# Patient Record
Sex: FEMALE | Race: WHITE | Employment: FULL TIME | URBAN - METROPOLITAN AREA
[De-identification: names, ages, dates, MRNs, and addresses within clinical notes are randomized per-mention and may not be internally consistent; named-entity substitution may affect disease eponyms.]

---

## 2017-01-23 ENCOUNTER — TELEPHONE (OUTPATIENT)
Dept: FAMILY MEDICINE CLINIC | Age: 27
End: 2017-01-23

## 2017-01-23 NOTE — TELEPHONE ENCOUNTER
Pt's psychiatry that you had referred the pt to has closed down so she is in need of advice for a new psych and a new referral to one. Also pt needs a prior auth for her adderall medication. She would like a physical copy so that she can take the authorization to the new psych whomever that might be. Please call pt with when you have a new psychiarty doctor for her.

## 2017-11-08 DIAGNOSIS — N91.0 DELAYED MENSES: Primary | ICD-10-CM

## 2017-11-16 ENCOUNTER — HOSPITAL ENCOUNTER (OUTPATIENT)
Dept: LAB | Age: 27
Discharge: HOME | End: 2017-11-16
Payer: SELF-PAY

## 2017-11-16 DIAGNOSIS — N91.0 DELAYED MENSES: ICD-10-CM

## 2017-11-16 LAB — HCG SERPL-ACNC: ABNORMAL MIU/ML (ref 0–10)

## 2017-11-16 PROCEDURE — 84702 CHORIONIC GONADOTROPIN TEST: CPT | Performed by: INTERNAL MEDICINE

## 2017-11-16 PROCEDURE — 36415 COLL VENOUS BLD VENIPUNCTURE: CPT | Performed by: INTERNAL MEDICINE

## 2017-11-17 NOTE — PROGRESS NOTES
Unable to reach patient, listed mobile number not in service, secondary home phone no voicemail set-up

## 2017-11-20 ENCOUNTER — TELEPHONE (OUTPATIENT)
Dept: FAMILY MEDICINE CLINIC | Age: 27
End: 2017-11-20

## 2017-11-20 NOTE — TELEPHONE ENCOUNTER
Attempted to contact patient to reschedule appt, unable to leave message voicemail not set-up, secondary number listed not a working number. No answer at emergency contact.

## 2018-01-25 ENCOUNTER — HOSPITAL ENCOUNTER (INPATIENT)
Age: 28
LOS: 4 days | Discharge: HOME | DRG: 781 | End: 2018-01-29
Attending: STUDENT IN AN ORGANIZED HEALTH CARE EDUCATION/TRAINING PROGRAM | Admitting: PSYCHIATRY & NEUROLOGY
Payer: COMMERCIAL

## 2018-01-25 PROCEDURE — 74011250637 HC RX REV CODE- 250/637: Performed by: PSYCHIATRY & NEUROLOGY

## 2018-01-25 PROCEDURE — 65220000003 HC RM SEMIPRIVATE PSYCH

## 2018-01-25 RX ORDER — DIPHENHYDRAMINE HYDROCHLORIDE 50 MG/ML
25 INJECTION, SOLUTION INTRAMUSCULAR; INTRAVENOUS
Status: DISCONTINUED | OUTPATIENT
Start: 2018-01-25 | End: 2018-01-29 | Stop reason: HOSPADM

## 2018-01-25 RX ORDER — ZOLPIDEM TARTRATE 5 MG/1
5 TABLET ORAL
Status: DISCONTINUED | OUTPATIENT
Start: 2018-01-25 | End: 2018-01-29 | Stop reason: HOSPADM

## 2018-01-25 RX ORDER — DIPHENHYDRAMINE HCL 25 MG
25 CAPSULE ORAL
Status: DISCONTINUED | OUTPATIENT
Start: 2018-01-25 | End: 2018-01-29 | Stop reason: HOSPADM

## 2018-01-25 RX ORDER — HALOPERIDOL 2 MG/1
2 TABLET ORAL
Status: DISCONTINUED | OUTPATIENT
Start: 2018-01-25 | End: 2018-01-29 | Stop reason: HOSPADM

## 2018-01-25 RX ORDER — HALOPERIDOL 5 MG/ML
2 INJECTION INTRAMUSCULAR
Status: DISCONTINUED | OUTPATIENT
Start: 2018-01-25 | End: 2018-01-29 | Stop reason: HOSPADM

## 2018-01-25 RX ADMIN — DIPHENHYDRAMINE HYDROCHLORIDE 25 MG: 25 CAPSULE ORAL at 20:54

## 2018-01-25 NOTE — IP AVS SNAPSHOT
[address] [facility] [address] 
 
 
 [address] [facility] Patient: Thomas Peñaloza MRN: [id number] SMQ:1/1/5867 About your hospitalization You were admitted on:  January 25, 2018 You last received care in the:  [facility] ADULT CHEM DEP You were discharged on:  January 29, 2018 Why you were hospitalized Your primary diagnosis was:  Not on File Your diagnoses also included:  Bipolar Disorder (Hcc) Follow-up Information Follow up With Details Comments Contact Info George Nelson, [address] [facility] [address] [employer] [address] [facility] [id number] [phone] [address] [facility] #[phone] at [address] Aliza Zuluaga [address] [facility] [address] [place], [address] [facility] Aliza Zuluaga Contact CSB person is Nikita Fuentes #[phone] & fax # [phone]. Aliza Zuluaga Pt's appointment is Thursday 2/1/18 at 8:30am with Abilio Grant for Orientation. Aliza Zuluaga Discharge Orders None A check leatha indicates which time of day the medication should be taken. My Medications STOP taking these medications   
 dextroamphetamine-amphetamine 20 mg tablet Commonly known as:  ADDERALL Discharge Instructions BEHAVIORAL HEALTH NURSING DISCHARGE NOTE The following personal items collected during your admission are returned to you:  
Dental Appliance: Dental Appliances: None Vision: Visual Aid: None Hearing Aid:   
Jewelry: Jewelry: None Clothing: Clothing: Footwear, Jacket/Coat, Pants, Shirt, With patient Other Valuables: Other Valuables: Cell Phone, Maryannelee Samiing (locker 123/1) Valuables sent to safe: Personal Items Sent to Safe: saulo dickson PATIENT INSTRUCTIONS: 
 
 
  
 
 
The discharge information has been reviewed with the patient. The patient verbalized understanding. Patient armband removed and shredded Introducing [facility]! Joselo Alexander introduces [employer] patient portal. Now you can access parts of your medical record, email your doctor's office, and request medication refills online. 1. In your internet browser, go to [email or web address] 2. Click on the First Time User? Click Here link in the Sign In box. You will see the New Member Sign Up page. 3. Enter your [employer]tek Access Code exactly as it appears below. You will not need to use this code after youve completed the sign-up process. If you do not sign up before the expiration date, you must request a new code. · [employer] Access Code: [email or web address] Expires: 2/24/2018 12:19 PM 
 
4. Enter the last four digits of your Social Security Number (xxxx) and Date of Birth (mm/dd/yyyy) as indicated and click Submit. You will be taken to the next sign-up page. 5. Create a Fangtek ID. This will be your [employer]tek login ID and cannot be changed, so think of one that is secure and easy to remember. 6. Create a Fangtek password. You can change your password at any time. 7. Enter your Password Reset Question and Answer. This can be used at a later time if you forget your password. 8. Enter your e-mail address. You will receive e-mail notification when new information is available in [address] 9. Click Sign Up. You can now view and download portions of your medical record. 10. Click the Download Summary menu link to download a portable copy of your medical information. If you have questions, please visit the Frequently Asked Questions section of the [employer] website. Remember, [employer] is NOT to be used for urgent needs. For medical emergencies, dial 911. Now available from your iPhone and Android! Providers Seen During Your Hospitalization Provider Specialty Primary office phone Deven Rodriguez MD Psychiatry [phone] Your Primary Care Physician (PCP) Primary Care Physician Office Phone Office Fax Surinder Plane [phone] You are allergic to the following No active allergies Recent Documentation Height Weight Breastfeeding? BMI OB Status Smoking Status 1.626 m 65.8 kg No 24.89 kg/m2 Pregnant Current Every Day Smoker Emergency Contacts Name Discharge Info Relation Home Work Mobile Cydney Bruno DISCHARGE CAREGIVER [3] Parent [1] [phone] Joannezoie Sterling  Boyfriend [17] [phone] Patient Belongings The following personal items are in your possession at time of discharge: 
  Dental Appliances: None  Visual Aid: None      Home Medications: None   Jewelry: None  Clothing: Footwear, Jacket/Coat, Pants, Shirt, With patient    Other Valuables: Cell Phone, [employer] (locker 123/1)  Personal Items Sent to Safe: saulo dickson Please provide this summary of care documentation to your next provider. Signatures-by signing, you are acknowledging that this After Visit Summary has been reviewed with you and you have received a copy. Patient Signature:  ____________________________________________________________ Date:  ____________________________________________________________  
  
Michelle María Elena Provider Signature:  ____________________________________________________________ Date:  ____________________________________________________________

## 2018-01-25 NOTE — IP AVS SNAPSHOT
[address] [place] [address] Patient: Marti Del Cid MRN: [id number] VCT:0/2/5028 A check leatha indicates which time of day the medication should be taken. My Medications STOP taking these medications   
 dextroamphetamine-amphetamine 20 mg tablet Commonly known as:  ADDERALL

## 2018-01-25 NOTE — BH NOTES
Patient arrived safely to unit ambulatory; A&O x4 and VSS. Admission paperwork was reviewed and signed by patient; patient rights and treatment team plan process. Patient belongings were checked and monitored for safety. Patient was oriented to unit and room. Patient is a 29 y/o female presenting with an animated affect and euthymic mood. She is 5 months pregnant, and lives with significant other, in her mother's house. Patient is unemployed, and reports having some financial and housing concerns. Patient denies SI/HI and AVH; responded to internal stimuli during admission assessment. Patient reports a hx of ADD; \" I take adderall three times a day, and am trying to ween off of it like my doctor said. \" Patient admits to smoking . 5 ppd of cigarettes and using marijuana \"once in a while\" but denied all other medication; UDS positive for THC, Amphetamine, Methamphetamine, and cocaine on 1/20/18. Patient also states that she is allergic to \"all antipsychotics\" and that they \"make me feel like I have too much estrogen\". Patient was quite guarded during the admission process and was delusional about reason for admission to BMU and length of stay.

## 2018-01-26 PROCEDURE — 74011250637 HC RX REV CODE- 250/637: Performed by: PSYCHIATRY & NEUROLOGY

## 2018-01-26 PROCEDURE — 65220000003 HC RM SEMIPRIVATE PSYCH

## 2018-01-26 RX ADMIN — PRENATAL VITAMINS-IRON FUMARATE 27 MG IRON-FOLIC ACID 0.8 MG TABLET 1 TABLET: at 08:19

## 2018-01-26 NOTE — H&P
History and Physical        Patient: Álvaro Donis               Sex: female          DOA: 1/25/2018         YOB: 1990      Age:  28 y.o.        LOS:  LOS: 1 day        HPI:     Álvaro Donis is a 28 y.o. female who was admitted experiencing delusional thinking, insomnia and auditory hallucinations. Active Problems:    [facility] ([facility]10[facility]2010)        Past Medical History:   Diagnosis Date    ADHD (attention deficit hyperactivity disorder)     Bipolar disorder ([facility] 3/10/2010    Polyphagia(783.6) 3/10/2010       Past Surgical History:   Procedure Laterality Date    HX GYN      D&C       Family History   Problem Relation Age of Onset    Diabetes Maternal Grandfather        Social History     Social History    Marital status: SINGLE     Spouse name: N/A    Number of children: 51/2 months pregnant    Years of education: 11     Social History Main Topics    Smoking status: Current Every Day Smoker     Packs/day: 0.50     Types: Cigarettes    Smokeless tobacco: Never Used    Alcohol use Yes      Comment: occassionally    Drug use: No    Sexual activity: No     Other Topics Concern    Not on file     Social History Narrative   Lives alone. Appetite and sleep are okay. Unemployed. Prior to Admission medications    Medication Sig Start Date End Date Taking? Authorizing Provider   dextroamphetamine-amphetamine (ADDERALL) 20 mg tablet Take 1 Tab by mouth two (2) times a day. Max Daily Amount: 40 mg. Patient taking differently: Take 20 mg by mouth three (3) times daily. 4/3/15   Damian Napier MD       No Known Allergies    Review of Systems  A comprehensive review of systems was negative.       Physical Exam:      Visit Vitals    /66 (BP 1 Location: Right arm, BP Patient Position: Sitting)    Pulse 90    Temp 96.7 °F (35.9 °C)    Resp 18    Ht 5' 4\" (1.626 m)    Wt 145 lb (65.8 kg)    Breastfeeding No    BMI 24.89 kg/m2       Physical Exam:    General: Alert, cooperative, well developed, well nourished  female, no distress, appears stated age. Eyes:  Conjunctivae/corneas clear. PERRL, EOMs intact. Fundi benign   Ears:  Normal TMs and external ear canals both ears. Nose: Nares normal. Septum midline. Mucosa normal. No drainage or sinus tenderness. Mouth/Throat: Lips, mucosa, and tongue normal. Teeth and gums normal.   Neck: Supple, symmetrical, trachea midline, no adenopathy, thyroid: no enlargement/tenderness/nodules, no carotid bruit and no JVD. Back:   Symmetric, no curvature. ROM normal. No CVA tenderness. Lungs:   Clear to auscultation bilaterally. Heart:  Regular rate and rhythm, S1, S2 normal, no murmur, click, rub or gallop. Abdomen:   51/2 months pregnant, non-tender. Bowel sounds normal. No masses,  No organomegaly. Extremities: Extremities normal, atraumatic, no cyanosis or edema. Pulses: 2+ and symmetric all extremities. Skin: Skin color, texture, turgor normal. No rashes or lesions   Lymph nodes: Cervical, supraclavicular, and axillary nodes normal.   Neurologic: CNII-XII intact. Normal strength, sensation and reflexes throughout.            Assessment/Plan     Delusions  Insomnia  Auditory Hallucinations  Labs reviewed (+Amphetamines, Cocaine)  Treatment per physician's orders

## 2018-01-26 NOTE — BH NOTES
Pt walked past nurses station - stopped - looked around- appeared to be responding to internal stimuli- but denied this.

## 2018-01-26 NOTE — BSMART NOTE
OCCUPATIONAL THERAPY PROGRESS NOTE  Group Time:  2403  Attendance: The patient attended full group. Participation:  The patient participated with minimal elaboration in the activity. Attention:  The patient was able to focus on the activity. Interaction:  The patient acknowledges others or responds to questions,  with no spontaneous interaction. \"Smiling\" throughout activity. Answers vague and hard for her to explain, said she wanted to change how she \"spreads her love around\" and through questioning this did not appear to mean anything clear or do anything different. No spontaneous interaction. Despite the constant \"smile\", affect seemed blunted.

## 2018-01-26 NOTE — BH NOTES
KANDICEHANDER Note: -S/O- The above pt has been pacing the entire unit all a.m. Shift. She has appeared very jose pious, thought blocking, and at times responding to internal stimuli at times when out in the milieu when pacing down the hallway. She has not been aggressive none this shift. She appeared upset due to not getting discharged however was able to calm down with re-direction this shift. She has a visit from MARGARITA  which appeared to have went well this shift. She has not experienced any falls this shift. -A-Problem #1 cont. -P-Maintain a safe and supportive enviorment.

## 2018-01-26 NOTE — BH NOTES
GROUP THERAPY PROGRESS NOTE    Jonathan Taylor is participating in [place]. Group time: 45 minutes    Personal goal for participation: rules/ regulations    Goal orientation: community    Group therapy participation: minimal and passive    Therapeutic interventions reviewed and discussed: She was calm during group. Impression of participation: She was alert calm but appeared to jpre-occupied in thought while in group she was educated on on coping skills she appeared to be receptive of this information during group.

## 2018-01-26 NOTE — BH NOTES
Maurilio Arita is not participating in [address] Group    Group time: 1hour    Personal goal for participation:  Seek information on Alcohol      Goal orientation: passive    Group therapy participation:   refuse    Therapeutic interventions reviewed and discussed: Staff encouraged Pt. To participate in Group    Impression of participation:   Pt. Refuse, chose to rest in bed, despite staff encouragement.
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
## 2018-01-26 NOTE — BSMART NOTE
SW Contact: Collateral with Maegan Francois #[phone]  & fax #[phone]. .. We reviewed screening & legal documents that accompanied pt from transfer from [facility]... She met with pt individually & we both did so together later. . Pt seemed to be extremely suspicious & cautious of what her comments were, sharing very little. Pt unwilling to sign any future release for [facility], nor wanting us to contact family. .. We both made her aware we would set up follow up appointments for her but would NOT force her to go. . That brought some relief, but still no release, not any more clinical shared. .. Her Dr was given updates on these interactions.

## 2018-01-27 PROCEDURE — 65220000003 HC RM SEMIPRIVATE PSYCH

## 2018-01-27 PROCEDURE — 74011250637 HC RX REV CODE- 250/637: Performed by: PSYCHIATRY & NEUROLOGY

## 2018-01-27 RX ADMIN — DIPHENHYDRAMINE HYDROCHLORIDE 25 MG: 25 CAPSULE ORAL at 02:58

## 2018-01-27 RX ADMIN — PRENATAL VITAMINS-IRON FUMARATE 27 MG IRON-FOLIC ACID 0.8 MG TABLET 1 TABLET: at 08:16

## 2018-01-27 NOTE — PROGRESS NOTES
Patient c/o restlessness and requested Benadryl; patient given Benadryl 25 mg given po for restlessness; will monitor and maintain safe environment.

## 2018-01-27 NOTE — BH NOTES
The pt was discussed with nursing staff and was seen during rounds. She states she would like more food as she continues to be hungry after completing her meals. She denies SI and HI. The pt also denies psychosis. She reports being prescribed Adderall as an outpatient and states it helps her sleep. The pt was reminded of PRNs available for sleep. She has been feeling the baby move and last felt her move today. MSE-  32yo WF. Appears stated age. Gravid abdomen. Appropriately dressed and groomed. Cooperative. Speech is normal in rate, volume, and tone. No psychomotor agitation or retardation. Stated mood is, \"Good. \"  Affect is odd. Thoughts are goal-directed and succinct. Denies SI and HI. Denies psychosis. Insight and judgment are difficult to gauge. A/P-ADHD by hx; Delusional disorder-preliminary  1. Continue hospitalization.

## 2018-01-27 NOTE — BH NOTES
GROUP THERAPY PROGRESS NOTE    Caesar Solitario is participating in Recreational Therapy. Group time: 1900    Personal goal for participation: fresh air break/games on the unit    Goal orientation: social    Group therapy participation: active    Therapeutic interventions reviewed and discussed: Staff encouraged Pt to get off the unit and go outside and get some fresh air, or play games on the unit with peers. Impression of participation:  Pt. chose to stay on the unit, play games with peers, color davi patterns and watch a movie.

## 2018-01-27 NOTE — BH NOTES
Patient continues to pace the unit and interacts with specific male patients. Patient will sit down and talk to certain patients but otherwise paces if they are not in the milieu. Patient will walk around and stand and look, then smile and walk away. Patient appears to have difficulty controlling her emotions at times but then she realizes instantly and reigns herself in. Patient has not been any concern on the unit except for the bizarre behavior that is harmless.

## 2018-01-27 NOTE — PROGRESS NOTES
Problem: Psychosis  Goal: *STG: Remains safe in hospital  Will remain safe and seek staff  instead of endorsing internal stimuli self harm, or harm towards others, arise on a daily basis during stay at hospital.   Outcome: Progressing Towards Goal  Pt remains safe. Problem: Falls - Risk of  Goal: *Absence of Falls  Document Daya Fall Risk and appropriate interventions in the flowsheet. Outcome: Progressing Towards Goal  Fall Risk Interventions:  Pt remains free of falls. Problem: Psychosis  Goal: *STG/LTG: Complies with medication therapy  Will take prescribed daily medications during stay at hospital.   Outcome: Progressing Towards Goal  Pt takes medications as ordered. Comments: Patient has been pacing around the milieu and has been pleasant but odd. Patient will stand and listen to conversations, not participate and then wander off. Patient got a little upset about something and started hollering at lunchtime seemingly about another patient but calmed down when staff approached. Patient asked for additional food at lunchtime and calmed down when it was given to her. Patient denies SI/HI and AVH. Patient will follow random patients around the unit, appear to interact slightly and then walk away. Patient was also noted to be going into room 126 which is a male room. Patient has been spoken to about that and was able to be redirected.

## 2018-01-27 NOTE — PROGRESS NOTES
Problem: Psychosis  Goal: *STG: Remains safe in hospital  Will remain safe and seek staff  instead of endorsing internal stimuli self harm, or harm towards others, arise on a daily basis during stay at hospital.   Outcome: Progressing Towards Goal  Did not engage in any hazardous behavior during shift. Problem: Falls - Risk of  Goal: *Absence of Falls  Document Daya Fall Risk and appropriate interventions in the flowsheet. Outcome: Progressing Towards Goal  Daya fall assessment completed and interventions maintained throughout shift. Problem: Psychosis  Goal: *STG/LTG: Demonstrates improved thought patterns as evidenced by logical and coherent speech  Will demonstrate improved thought patterns, as evidenced by logical and coherent speech, during daily interactions throughout stay at hospital.   Outcome: Not Progressing Towards Goal  Still endorsing internal stimuli, and having difficulty with coherent speech during extended interactions.     Comments: Did not engage in any hazardous behavior during shift.     Daya fall assessment completed and interventions maintained throughout shift.     Still endorsing internal stimuli, and having difficulty with coherent speech during extended interactions.

## 2018-01-27 NOTE — BH NOTES
Willard Hines is participating in Leisure Activity Group. Group time: 6025    Personal goal for participation:  Decrease Anxiety    Goal orientation: social    Group therapy participation: active    Therapeutic interventions reviewed and discussed:  Staff encouraged to  choos relaxation activities to decrease anxiety while interacting with peers    Impression of participation:  Pt. chose to , play games with peers, color davi patterns or watch a movie.
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
## 2018-01-28 PROCEDURE — 74011250637 HC RX REV CODE- 250/637: Performed by: PSYCHIATRY & NEUROLOGY

## 2018-01-28 PROCEDURE — 65220000003 HC RM SEMIPRIVATE PSYCH

## 2018-01-28 RX ADMIN — PRENATAL VITAMINS-IRON FUMARATE 27 MG IRON-FOLIC ACID 0.8 MG TABLET 1 TABLET: at 08:04

## 2018-01-28 NOTE — BH NOTES
The pt was discussed with nursing staff and was seen during rounds. The pt reports having slept well. She reports feeling more emotional today due to missing her boyfriend. She states he has been in alf since she has been hospitalized. The pt states she is anxious for discharge. She continues to feel her baby move.       MSE-  26yo WF. Appears stated age. Gravid abdomen. Appropriately dressed and groomed. Cooperative. Speech is normal in rate, volume, and tone. No psychomotor agitation or retardation. Stated mood continues to be, \"Good. \"  Affect is tearful at times. Thoughts are somewhat disorganized at times. Denies SI and HI. Denies psychosis. Insight and judgment appear somewhat limited.       A/P-Delusional disorder-preliminary; ADHD by hx  1. Continue hospitalization.

## 2018-01-28 NOTE — PROGRESS NOTES
Problem: Psychosis  Goal: *STG: Decreased hallucinations  Will have decreased or absence of hallucinations on a daily basis during stay at hospital aeb decreased ritualistic behavior and improved ADLs. Outcome: Progressing Towards Goal  aeb patient denies any hallucinations    Problem: Psychosis  Goal: *STG: Remains safe in hospital  Will remain safe and seek staff  instead of endorsing internal stimuli self harm, or harm towards others, arise on a daily basis during stay at hospital.   Outcome: Progressing Towards Goal  aeb no unsafe behaviors observed    Comments: Patient is alert an oriented x 3. She does come out into the day area for brief periods but sits alone and is observed sitting quietly. She has been cooperative. Patient does have an odd. Flat affect and was very guarded during assessment. She reports has felt the baby move and and held up six fingers when asked how far along she is with her pregnancy. She denies visual and auditory hallucinations. No suicidal or homicidal ideations. Staff continues to monitor for safety and provide a supportive environment.

## 2018-01-28 NOTE — BH NOTES
Patient has been pacing around the unit today and has required redirection a couple of times because she was wandering close to other rooms. Patient is pleasant and cooperative and able to engage in a conversation about her baby, but seems to be guarded still. Patient came out of her room and spoke some angry nonsense about pineapples to the person on the phone and then slammed her door going in to her room but other than that she has been fine all shift. Patient denies SI/HI and AVH and is looking forward to discharge tomorrow.

## 2018-01-28 NOTE — BH NOTES
GROUP THERAPY PROGRESS NOTE    Caesar Solitario is participating in medication. Group time: 15 minutes    Personal goal for participation: Understanding the benefits of medication.     Goal orientation: medication education    Group therapy participation: minimal    Therapeutic interventions reviewed and discussed:     Impression of participation: Patient continued pacing the unit

## 2018-01-28 NOTE — BH NOTES
Patient walking around the unit appearing to respond to internal stimuli. Patient denied AVH at current. Patient was redirected for wearing a sheer shirt that was also too short. Patient took constructive criticism without difficulty and went to room.

## 2018-01-28 NOTE — BH NOTES
GROUP THERAPY PROGRESS NOTE    Roslyn Swenson is participating in [place]. Group time: 30 minutes    Personal goal for participation: discussed unit guidelines and expectations on unit      Goal orientation: personal    Group therapy participation: minimal    Therapeutic interventions reviewed and discussed:     Impression of participation: Sat appearing to be listening but at times getting up to pace.

## 2018-01-29 VITALS
WEIGHT: 145 LBS | SYSTOLIC BLOOD PRESSURE: 113 MMHG | TEMPERATURE: 98.1 F | HEART RATE: 94 BPM | RESPIRATION RATE: 18 BRPM | HEIGHT: 64 IN | DIASTOLIC BLOOD PRESSURE: 71 MMHG | BODY MASS INDEX: 24.75 KG/M2

## 2018-01-29 PROCEDURE — 74011250637 HC RX REV CODE- 250/637: Performed by: PSYCHIATRY & NEUROLOGY

## 2018-01-29 RX ADMIN — PRENATAL VITAMINS-IRON FUMARATE 27 MG IRON-FOLIC ACID 0.8 MG TABLET 1 TABLET: at 09:00

## 2018-01-29 NOTE — DISCHARGE INSTRUCTIONS
BEHAVIORAL HEALTH NURSING DISCHARGE NOTE      The following personal items collected during your admission are returned to you:   Dental Appliance: Dental Appliances: None  Vision: Visual Aid: None  Hearing Aid:    Jewelry: Jewelry: None  Clothing: Clothing: Footwear, Jacket/Coat, Pants, Shirt, With patient  Other Valuables: Other Valuables: Cell Phone, Jeff  (locker 123/1)  Valuables sent to safe: Personal Items Sent to Safe: saulo [place] randolph      PATIENT INSTRUCTIONS:             The discharge information has been reviewed with the patient. The patient verbalized understanding.         Patient armband removed and shredded

## 2018-01-29 NOTE — PROGRESS NOTES
Patient slightly teary-eyed, \"just getting out some emotions. ..don't want to talk about it. \" paced in the hallway for a short time, then sat quietly in the milieu; will monitor and maintain safe environment.

## 2018-01-29 NOTE — PROGRESS NOTES
Patient received discharge appts. Patient states she called her mother and she was ready to leave. Patient states she should be here in 5 minutes. Discuss discharge plans with . No further orders received. . All personal items given to patient. Escorted out of building.

## 2018-01-29 NOTE — BSMART NOTE
SW Contact: collateral: Phone & text with Maegan Francois #[phone]. .. Gave her update on pt's stabilizing over weekend & dr's decision to d/c pt, with pt agreeing to comply with [facility] appointments. .   Ms Chasity Childress still plans to visit pt prior to d/c.

## 2018-01-29 NOTE — BSMART NOTE
SW Contact:  Today's interaction started somewhat guarded but not as intense as prior, once pt understood it was about her d/c planning. .. She agreed to comply with outpt services with [address] [place] [address] (see isabel for details). She is arranging transportation with her mom to assist pt in returning [place] [facility] [place].
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
## 2018-04-12 ENCOUNTER — TELEPHONE (OUTPATIENT)
Dept: FAMILY MEDICINE CLINIC | Age: 28
End: 2018-04-12

## 2018-04-12 NOTE — TELEPHONE ENCOUNTER
Pepper Peralta can you confirm this, I believe she failed a UDS before her pregnancy, and is under psych care

## 2018-04-12 NOTE — TELEPHONE ENCOUNTER
Pt called to request a refill on her Adderal 20 mg x 3 times a day.      Future Appointments  Date Time Provider Maegan Lissa   4/17/2018 3:45 PM Rip Jameson MD [facility]

## 2018-04-13 ENCOUNTER — TELEPHONE (OUTPATIENT)
Dept: FAMILY MEDICINE CLINIC | Age: 28
End: 2018-04-13

## 2018-04-13 NOTE — TELEPHONE ENCOUNTER
She was refereed to [facility] in 2015 (printed)  They've disbanded so she needs a new referral if she is not with any psych at present  In any case I have closed my practice to any new or returned add therapies

## 2018-04-13 NOTE — TELEPHONE ENCOUNTER
UDS on 1-20-18 ordered by ER  Positive for marijuana, amphetamine,      There were no notes to terminate patient after 1/20/18, UDS. There is no current narc agreement on file. Please advise.

## 2018-04-17 ENCOUNTER — OFFICE VISIT (OUTPATIENT)
Dept: FAMILY MEDICINE CLINIC | Age: 28
End: 2018-04-17

## 2018-04-17 VITALS
SYSTOLIC BLOOD PRESSURE: 130 MMHG | WEIGHT: 169 LBS | HEIGHT: 64 IN | HEART RATE: 113 BPM | TEMPERATURE: 97 F | DIASTOLIC BLOOD PRESSURE: 96 MMHG | RESPIRATION RATE: 22 BRPM | BODY MASS INDEX: 28.85 KG/M2 | OXYGEN SATURATION: 98 %

## 2018-04-17 DIAGNOSIS — F90.9 ATTENTION DEFICIT HYPERACTIVITY DISORDER (ADHD), UNSPECIFIED ADHD TYPE: Primary | ICD-10-CM

## 2018-04-17 RX ORDER — LANOLIN ALCOHOL/MO/W.PET/CERES
CREAM (GRAM) TOPICAL
COMMUNITY
End: 2018-05-12

## 2018-04-17 RX ORDER — DEXTROAMPHETAMINE SACCHARATE, AMPHETAMINE ASPARTATE, DEXTROAMPHETAMINE SULFATE AND AMPHETAMINE SULFATE 5; 5; 5; 5 MG/1; MG/1; MG/1; MG/1
20 TABLET ORAL
Status: CANCELLED | OUTPATIENT
Start: 2018-04-17

## 2018-04-17 RX ORDER — DEXTROAMPHETAMINE SACCHARATE, AMPHETAMINE ASPARTATE, DEXTROAMPHETAMINE SULFATE AND AMPHETAMINE SULFATE 5; 5; 5; 5 MG/1; MG/1; MG/1; MG/1
20 TABLET ORAL
COMMUNITY
End: 2018-10-04 | Stop reason: SDUPTHER

## 2018-04-17 NOTE — PROGRESS NOTES
Cynthia Davison is a 33 y.o. female (: 1990) presenting to address:    Chief Complaint   Patient presents with    Attention Deficit Disorder    Medication Refill     pain scale 0/10       Vitals:    18 1516   BP: (!) 130/96   Pulse: (!) 113   Resp: 22   Temp: 97 °F (36.1 °C)   TempSrc: Oral   SpO2: 98%   Weight: 169 lb (76.7 kg)   Height: 5' 4\" (1.626 m)   PainSc:   0 - No pain       Hearing/Vision:   No exam data present    Learning Assessment:     Learning Assessment 2015   PRIMARY LEARNER Patient   HIGHEST LEVEL OF EDUCATION - PRIMARY LEARNER  DID NOT GRADUATE HIGH SCHOOL   BARRIERS PRIMARY LEARNER -   CO-LEARNER CAREGIVER -   PRIMARY LANGUAGE ENGLISH    NEED -   LEARNER PREFERENCE PRIMARY PICTURES   ANSWERED BY patient   RELATIONSHIP SELF     Depression Screening:   No flowsheet data found. Fall Risk Assessment:   No flowsheet data found. Abuse Screening:     Abuse Screening Questionnaire 4/3/2015   Do you ever feel afraid of your partner? N   Are you in a relationship with someone who physically or mentally threatens you? N   Is it safe for you to go home? Y     Coordination of Care Questionaire:   1. Have you been to the ER, urgent care clinic since your last visit? Hospitalized since your last visit? NO    2. Have you seen or consulted any other health care providers outside of the [address] [place] [address] since your last visit? Include any pap smears or colon screening. YES Dr. Dixon    Advanced Directive:   1. Do you have an Advanced Directive? NO    2. Would you like information on Advanced Directives?  NO

## 2018-04-17 NOTE — PROGRESS NOTES
HISTORY OF PRESENT ILLNESS  Ivania Shore is a 33 y.o. female. HPI  Add under psych  Almost at term of pregnancy    Review of Systems   All other systems reviewed and are negative. Past Medical History:   Diagnosis Date    ADHD (attention deficit hyperactivity disorder)     Bipolar disorder ([facility] [address] 3/10/2010    Polyphagia(783.6) 3/10/2010     No current outpatient prescriptions on file prior to visit. No current facility-administered medications on file prior to visit. Visit Vitals    BP (!) 130/96 (BP 1 Location: Right arm, BP Patient Position: Sitting)    Pulse (!) 113    Temp 97 °F (36.1 °C) (Oral)    Resp 22    Ht 5' 4\" (1.626 m)    Wt 169 lb (76.7 kg)    SpO2 98%    BMI 29.01 kg/m2         Physical Exam   Constitutional: She appears well-developed and well-nourished. No distress. Skin: She is not diaphoretic. Psychiatric: She has a normal mood and affect. Her behavior is normal. Judgment and thought content normal.   Vitals reviewed.   d/w Dr. Nabila León[facility]  Recent + UDS  Gyn recommends avoiding amphetamine stimulants due to  drug withdrawal syndrome  Related all this to patient  Also my policy to no longer accept new patients for ADD therapy    ASSESSMENT and PLAN  ADD   Plan  F/u prn  Review UDS  Refer to psych

## 2018-04-17 NOTE — MR AVS SNAPSHOT
Leonides Shell 
 
 
 [address] [facility] [id number] [phone] Patient: Noelle Roa MRN: [id number] Visit Information Date & Time Provider Department Dept. Phone Encounter #  
 4/17/2018  3:45 PM Zaid Marquez MD [employer] [phone] [id number] Follow-up Instructions Return if symptoms worsen or fail to improve. Follow-up and Disposition History Upcoming Health Maintenance Date Due Pneumococcal 19-64 Medium Risk (1 of 1 - PPSV23) 2/9/2008 DTaP/Tdap/Td series (1 - Tdap) 2/9/2010 PAP AKA CERVICAL CYTOLOGY 2/9/2010 Influenza Age 5 to Adult 8/1/2017 OB 3RD TRIMESTER TDAP 2/5/2018 Allergies as of 4/17/2018  Review Complete On: 4/17/2018 By: Zaid Marquez MD  
 No Known Allergies Current Immunizations  Reviewed on 1/27/2018 No immunizations on file. Not reviewed this visit You Were Diagnosed With   
  
 Codes Comments Attention deficit hyperactivity disorder (ADHD), unspecified ADHD type    -  Primary ICD-10-CM: F90.9 ICD-9-CM: 314.01 Vitals BP Pulse Temp Resp Height(growth percentile) Weight(growth percentile) (!) 130/96 (BP 1 Location: Right arm, BP Patient Position: Sitting) (!) 113 97 °F (36.1 °C) (Oral) 22 5' 4\" (1.626 m) 169 lb (76.7 kg) SpO2 BMI OB Status Smoking Status 98% 29.01 kg/m2 Pregnant Current Every Day Smoker Vitals History BMI and BSA Data Body Mass Index Body Surface Area  
 29.01 kg/m 2 1.86 m 2 Preferred Pharmacy Pharmacy Name Phone [facility] [phone] [facility] [address] [place] [address] AT [place] [phone] Your Updated Medication List  
  
   
This list is accurate as of 4/17/18  4:17 PM.  Always use your most recent med list.  
  
  
  
  
 ADDERALL 20 mg tablet Generic drug:  dextroamphetamine-amphetamine Take 20 mg by mouth. ferrous sulfate 325 mg (65 mg iron) tablet Take  by mouth Daily (before breakfast). prenatal vit-iron fumarate-fa 27 mg iron- 0.8 mg Tab tablet Take 1 Tab by mouth daily. Follow-up Instructions Return if symptoms worsen or fail to improve. Introducing [facility] & HEALTH SERVICES! Hayes Harding introduces MitoProd patient portal. Now you can access parts of your medical record, email your doctor's office, and request medication refills online. 1. In your internet browser, go to [email or web address] 2. Click on the First Time User? Click Here link in the Sign In box. You will see the New Member Sign Up page. 3. Enter your MitoProd Access Code exactly as it appears below. You will not need to use this code after youve completed the sign-up process. If you do not sign up before the expiration date, you must request a new code. · MitoProd Access Code: [email or web address] Expires: 7/16/2018  4:17 PM 
 
4. Enter the last four digits of your Social Security Number (xxxx) and Date of Birth (mm/dd/yyyy) as indicated and click Submit. You will be taken to the next sign-up page. 5. Create a MitoProd ID. This will be your MitoProd login ID and cannot be changed, so think of one that is secure and easy to remember. 6. Create a MitoProd password. You can change your password at any time. 7. Enter your Password Reset Question and Answer. This can be used at a later time if you forget your password. 8. Enter your e-mail address. You will receive e-mail notification when new information is available in [address] 9. Click Sign Up. You can now view and download portions of your medical record. 10. Click the Download Summary menu link to download a portable copy of your medical information. If you have questions, please visit the Frequently Asked Questions section of the MitoProd website.  Remember, MitoProd is NOT to be used for urgent needs. For medical emergencies, dial 911. Now available from your iPhone and Android! Please provide this summary of care documentation to your next provider. Your primary care clinician is listed as Jeniffer Johnson. If you have any questions after today's visit, please call [phone].

## 2018-05-08 PROBLEM — O48.0 POST TERM PREGNANCY: Status: ACTIVE | Noted: 2018-05-08

## 2018-05-22 ENCOUNTER — OFFICE VISIT (OUTPATIENT)
Dept: FAMILY MEDICINE CLINIC | Age: 28
End: 2018-05-22

## 2018-05-22 VITALS
TEMPERATURE: 96.1 F | OXYGEN SATURATION: 96 % | SYSTOLIC BLOOD PRESSURE: 144 MMHG | BODY MASS INDEX: 25.23 KG/M2 | RESPIRATION RATE: 20 BRPM | HEIGHT: 64 IN | WEIGHT: 147.8 LBS | DIASTOLIC BLOOD PRESSURE: 96 MMHG | HEART RATE: 92 BPM

## 2018-05-22 DIAGNOSIS — F90.9 ATTENTION DEFICIT HYPERACTIVITY DISORDER (ADHD), UNSPECIFIED ADHD TYPE: ICD-10-CM

## 2018-05-22 RX ORDER — DEXTROAMPHETAMINE SACCHARATE, AMPHETAMINE ASPARTATE, DEXTROAMPHETAMINE SULFATE AND AMPHETAMINE SULFATE 5; 5; 5; 5 MG/1; MG/1; MG/1; MG/1
20 TABLET ORAL 2 TIMES DAILY
Qty: 60 TAB | Refills: 0 | Status: CANCELLED | OUTPATIENT
Start: 2018-05-22

## 2018-05-22 NOTE — MR AVS SNAPSHOT
[address] [place] [address] [address] [facility] [id number] [phone] Patient: Terence Galo MRN: [id number] WN Visit Information Date & Time Provider Department Dept. Phone Encounter #  
 2018 10:45 AM Therese [employer] [phone] [id number] Follow-up Instructions Return if symptoms worsen or fail to improve. Upcoming Health Maintenance Date Due  
 PAP AKA CERVICAL CYTOLOGY 2010 Pneumococcal 19-64 Medium Risk (1 of 1 - PPSV23) 10/22/2018* Influenza Age 5 to Adult 2018 DTaP/Tdap/Td series (2 - Td) 5/10/2028 *Topic was postponed. The date shown is not the original due date. Allergies as of 2018  Review Complete On: 2018 By: Remington Chaudhari MD  
 No Known Allergies Current Immunizations  Reviewed on 2018 Name Date Tdap 5/10/2018  6:59 PM  
  
 Not reviewed this visit You Were Diagnosed With   
  
 Codes Comments Attention deficit hyperactivity disorder (ADHD), unspecified ADHD type     ICD-10-CM: F90.9 ICD-9-CM: 314.01 Vitals BP Pulse Temp Resp Height(growth percentile) Weight(growth percentile) (!) 144/96 (BP 1 Location: Right arm, BP Patient Position: Sitting) 92 96.1 °F (35.6 °C) (Oral) 20 5' 4\" (1.626 m) 147 lb 12.8 oz (67 kg) SpO2 BMI OB Status Smoking Status 96% 25.37 kg/m2 Recent pregnancy Current Every Day Smoker BMI and BSA Data Body Mass Index Body Surface Area  
 25.37 kg/m 2 1.74 m 2 Preferred Pharmacy Pharmacy Name Phone [facility] [phone] [facility], [address] [place] [address] AT [place] [address] Your Updated Medication List  
  
   
This list is accurate as of 18 11:53 AM.  Always use your most recent med list.  
  
  
  
  
 * ADDERALL 20 mg tablet Generic drug:  dextroamphetamine-amphetamine Take 20 mg by mouth. * dextroamphetamine-amphetamine 20 mg tablet Commonly known as:  ADDERALL Take 1 Tab (20 mg total) by mouth two (2) times a dayIndications: Attention-Deficit Hyperactivity Disorder. Max Daily Amount: 40 mg  
  
 ibuprofen 600 mg tablet Commonly known as:  MOTRIN Take 1 Tab by mouth every six (6) hours as needed. oxyCODONE-acetaminophen 5-325 mg per tablet Commonly known as:  PERCOCET Take 1-2 Tabs by mouth every four (4) hours as needed. Max Daily Amount: 12 Tabs. prenatal vit-iron fumarate-fa 27 mg iron- 0.8 mg Tab tablet Take 1 Tab by mouth daily. * Notice: This list has 2 medication(s) that are the same as other medications prescribed for you. Read the directions carefully, and ask your doctor or other care provider to review them with you. Follow-up Instructions Return if symptoms worsen or fail to improve. Introducing [facility] & HEALTH SERVICES! [employer] introduces Bueroservice24 patient portal. Now you can access parts of your medical record, email your doctor's office, and request medication refills online. 1. In your internet browser, go to [email or web address] 2. Click on the First Time User? Click Here link in the Sign In box. You will see the New Member Sign Up page. 3. Enter your Bueroservice24 Access Code exactly as it appears below. You will not need to use this code after youve completed the sign-up process. If you do not sign up before the expiration date, you must request a new code. · [email or web address] Access Code: [email or web address] Expires: 7/16/2018  4:17 PM 
 
4. Enter the last four digits of your Social Security Number (xxxx) and Date of Birth (mm/dd/yyyy) as indicated and click Submit. You will be taken to the next sign-up page. 5. Create a Bueroservice24 ID. This will be your Bueroservice24 login ID and cannot be changed, so think of one that is secure and easy to remember. 6. Create a Click4Care password. You can change your password at any time. 7. Enter your Password Reset Question and Answer. This can be used at a later time if you forget your password. 8. Enter your e-mail address. You will receive e-mail notification when new information is available in [address] 9. Click Sign Up. You can now view and download portions of your medical record. 10. Click the Download Summary menu link to download a portable copy of your medical information. If you have questions, please visit the Frequently Asked Questions section of the Click4Care website. Remember, Click4Care is NOT to be used for urgent needs. For medical emergencies, dial 911. Now available from your iPhone and Android! Please provide this summary of care documentation to your next provider. Your primary care clinician is listed as Nitish Retana. If you have any questions after today's visit, please call [phone].

## 2018-05-22 NOTE — PROGRESS NOTES
Symone Jain is a 33 y.o. female (: 1990) presenting to address:    Chief Complaint   Patient presents with    Medication Refill     pain scale 0/10       Vitals:    18 1047   BP: (!) 144/96   Pulse: 92   Resp: 20   Temp: 96.1 °F (35.6 °C)   TempSrc: Oral   SpO2: 96%   Weight: 147 lb 12.8 oz (67 kg)   Height: 5' 4\" (1.626 m)   PainSc:   0 - No pain       Hearing/Vision:   No exam data present    Learning Assessment:     Learning Assessment 2015   PRIMARY LEARNER Patient   HIGHEST LEVEL OF EDUCATION - PRIMARY LEARNER  DID NOT GRADUATE HIGH SCHOOL   BARRIERS PRIMARY LEARNER -   CO-LEARNER CAREGIVER -   PRIMARY LANGUAGE ENGLISH    NEED -   LEARNER PREFERENCE PRIMARY PICTURES   ANSWERED BY patient   RELATIONSHIP SELF     Depression Screening:   No flowsheet data found. Fall Risk Assessment:   No flowsheet data found. Abuse Screening:     Abuse Screening Questionnaire 4/3/2015   Do you ever feel afraid of your partner? N   Are you in a relationship with someone who physically or mentally threatens you? N   Is it safe for you to go home? Y     Coordination of Care Questionaire:   1. Have you been to the ER, urgent care clinic since your last visit? Hospitalized since your last visit? YES [facility]    2. Have you seen or consulted any other health care providers outside of the [facility] since your last visit? Include any pap smears or colon screening. YES pediatric affiliates of [facility]    Advanced Directive:   1. Do you have an Advanced Directive? NO    2. Would you like information on Advanced Directives?  NO

## 2018-05-22 NOTE — PROGRESS NOTES
HISTORY OF PRESENT ILLNESS  Kyung Valdes is a 33 y.o. female. HPI  Add stable  Recent pregnancy  Review of Systems   All other systems reviewed and are negative. Past Medical History:   Diagnosis Date    ADHD (attention deficit hyperactivity disorder)     Bipolar disorder ([facility] [address] 3/10/2010    Polyphagia(783.6) 3/10/2010     Current Outpatient Prescriptions on File Prior to Visit   Medication Sig Dispense Refill    dextroamphetamine-amphetamine (ADDERALL) 20 mg tablet Take 1 Tab (20 mg total) by mouth two (2) times a dayIndications: Attention-Deficit Hyperactivity Disorder. Max Daily Amount: 40 mg 60 Tab 0    dextroamphetamine-amphetamine (ADDERALL) 20 mg tablet Take 20 mg by mouth.  ibuprofen (MOTRIN) 600 mg tablet Take 1 Tab by mouth every six (6) hours as needed. 90 Tab 0    oxyCODONE-acetaminophen (PERCOCET) 5-325 mg per tablet Take 1-2 Tabs by mouth every four (4) hours as needed. Max Daily Amount: 12 Tabs. 25 Tab 0    prenatal vit-iron fumarate-fa 27 mg iron- 0.8 mg tab tablet Take 1 Tab by mouth daily. No current facility-administered medications on file prior to visit. Visit Vitals    BP (!) 144/96 (BP 1 Location: Right arm, BP Patient Position: Sitting)    Pulse 92    Temp 96.1 °F (35.6 °C) (Oral)    Resp 20    Ht 5' 4\" (1.626 m)    Wt 147 lb 12.8 oz (67 kg)    SpO2 96%    BMI 25.37 kg/m2       Physical Exam   Constitutional: She appears well-developed and well-nourished. No distress. Skin: She is not diaphoretic. Psychiatric: She has a normal mood and affect. Her behavior is normal. Judgment and thought content normal.   Vitals reviewed.    reviewed  Has adderal until 06/12 from gyn (?)  Explained I am not accepting any new patients for add rx  Was given [place] city service # to call for [facility] mental health services    ASSESSMENT and PLAN  add   Plan  As above  F/u prn

## 2018-05-25 ENCOUNTER — TELEPHONE (OUTPATIENT)
Dept: FAMILY MEDICINE CLINIC | Age: 28
End: 2018-05-25

## 2018-05-25 NOTE — TELEPHONE ENCOUNTER
Pt had an appt with Dr. Vickey Jaime, 5/22/18, and states she was given a referral number for medication of Adderall. The pt states no one is picking up and wants to know if its possible to get another referral. Please advise.

## 2018-06-07 RX ORDER — DEXTROAMPHETAMINE SACCHARATE, AMPHETAMINE ASPARTATE, DEXTROAMPHETAMINE SULFATE AND AMPHETAMINE SULFATE 5; 5; 5; 5 MG/1; MG/1; MG/1; MG/1
20 TABLET ORAL
OUTPATIENT
Start: 2018-06-07

## 2018-06-21 ENCOUNTER — TELEPHONE (OUTPATIENT)
Dept: FAMILY MEDICINE CLINIC | Age: 28
End: 2018-06-21

## 2018-06-21 NOTE — TELEPHONE ENCOUNTER
Patient is at [facility] ER with her child, they are calling stating patients baby is failing to thrive, states child is less than birth weight at 9 weeks old. They are concerned with her mental health, patient tells them that Dr. Gerald Sandhoff is that last person to prescribe her medication. I informed them Gerald Sandhoff has not treated her for mental health since 2015 as we had referred her out to [facility] in 2015 and she had not been back to us until April 2018. They faxed over a records request so I printed last 5 office visits and sent it back to them.

## 2018-09-21 ENCOUNTER — TELEPHONE (OUTPATIENT)
Dept: FAMILY MEDICINE CLINIC | Age: 28
End: 2018-09-21

## 2018-09-21 DIAGNOSIS — Z00.00 ROUTINE GENERAL MEDICAL EXAMINATION AT A HEALTH CARE FACILITY: Primary | ICD-10-CM

## 2018-09-21 NOTE — TELEPHONE ENCOUNTER
Pt needs lab orders for upcoming physical. Please call pt when orders are in.      Future Appointments  Date Time Provider Maegan Lissa   10/4/2018 2:30 PM Jory Li  W. Annabella Gamino

## 2018-09-28 LAB
25(OH)D3 SERPL-MCNC: 18 NG/ML (ref 32–100)
A-G RATIO,AGRAT: 1.6 RATIO (ref 1.1–2.6)
ALBUMIN SERPL-MCNC: 4.5 G/DL (ref 3.5–5)
ALP SERPL-CCNC: 57 U/L (ref 25–115)
ALT SERPL-CCNC: 13 U/L (ref 5–40)
ANION GAP SERPL CALC-SCNC: 17 MMOL/L
AST SERPL W P-5'-P-CCNC: 11 U/L (ref 10–37)
BILIRUB SERPL-MCNC: 0.5 MG/DL (ref 0.2–1.2)
BILIRUBIN, DIRECT,CBIL: <0.2 MG/DL (ref 0–0.3)
BUN SERPL-MCNC: 9 MG/DL (ref 6–22)
CALCIUM SERPL-MCNC: 9.4 MG/DL (ref 8.4–10.5)
CHLORIDE SERPL-SCNC: 99 MMOL/L (ref 98–110)
CHOLEST SERPL-MCNC: 190 MG/DL (ref 110–200)
CO2 SERPL-SCNC: 24 MMOL/L (ref 20–32)
CREAT SERPL-MCNC: 0.5 MG/DL (ref 0.5–1.2)
ERYTHROCYTE [DISTWIDTH] IN BLOOD BY AUTOMATED COUNT: 15.1 % (ref 10–15.5)
GFRAA, 66117: >60
GFRNA, 66118: >60
GLOBULIN,GLOB: 2.8 G/DL (ref 2–4)
GLUCOSE SERPL-MCNC: 89 MG/DL (ref 70–99)
HCT VFR BLD AUTO: 41.1 % (ref 35.1–46.5)
HDLC SERPL-MCNC: 3.2 MG/DL (ref 0–5)
HDLC SERPL-MCNC: 59 MG/DL (ref 40–59)
HGB BLD-MCNC: 13.1 G/DL (ref 11.7–15.5)
LDLC SERPL CALC-MCNC: 115 MG/DL (ref 50–99)
MCH RBC QN AUTO: 27 PG (ref 26–34)
MCHC RBC AUTO-ENTMCNC: 32 G/DL (ref 31–36)
MCV RBC AUTO: 85 FL (ref 80–95)
PLATELET # BLD AUTO: 391 K/UL (ref 140–440)
PMV BLD AUTO: 10.1 FL (ref 9–13)
POTASSIUM SERPL-SCNC: 4 MMOL/L (ref 3.5–5.5)
PROT SERPL-MCNC: 7.3 G/DL (ref 6.4–8.3)
RBC # BLD AUTO: 4.86 M/UL (ref 3.8–5.2)
SODIUM SERPL-SCNC: 140 MMOL/L (ref 133–145)
TRIGL SERPL-MCNC: 76 MG/DL (ref 40–149)
VLDLC SERPL CALC-MCNC: 15 MG/DL (ref 8–30)
WBC # BLD AUTO: 6.6 K/UL (ref 4–11)

## 2018-10-04 ENCOUNTER — OFFICE VISIT (OUTPATIENT)
Dept: FAMILY MEDICINE CLINIC | Age: 28
End: 2018-10-04

## 2018-10-04 VITALS
SYSTOLIC BLOOD PRESSURE: 126 MMHG | TEMPERATURE: 97.2 F | HEIGHT: 64 IN | BODY MASS INDEX: 27.31 KG/M2 | RESPIRATION RATE: 18 BRPM | HEART RATE: 107 BPM | WEIGHT: 160 LBS | OXYGEN SATURATION: 98 % | DIASTOLIC BLOOD PRESSURE: 90 MMHG

## 2018-10-04 DIAGNOSIS — Z11.4 SCREENING FOR HIV (HUMAN IMMUNODEFICIENCY VIRUS): ICD-10-CM

## 2018-10-04 DIAGNOSIS — Z23 ENCOUNTER FOR IMMUNIZATION: ICD-10-CM

## 2018-10-04 DIAGNOSIS — Z00.00 ROUTINE GENERAL MEDICAL EXAMINATION AT A HEALTH CARE FACILITY: Primary | ICD-10-CM

## 2018-10-04 DIAGNOSIS — E55.9 VITAMIN D DEFICIENCY: ICD-10-CM

## 2018-10-04 NOTE — PROGRESS NOTES
Subjective:   33 y.o. female for Well Woman Check. Her gyne and breast care is done elsewhere by her Ob-Gyne physician. Current Outpatient Prescriptions   Medication Sig Dispense Refill    dextroamphetamine-amphetamine (ADDERALL) 20 mg tablet Take 1 Tab (20 mg total) by mouth two (2) times a dayIndications: Attention-Deficit Hyperactivity Disorder. Max Daily Amount: 40 mg 60 Tab 0    ibuprofen (MOTRIN) 600 mg tablet Take 1 Tab by mouth every six (6) hours as needed. 90 Tab 0    oxyCODONE-acetaminophen (PERCOCET) 5-325 mg per tablet Take 1-2 Tabs by mouth every four (4) hours as needed. Max Daily Amount: 12 Tabs. 25 Tab 0    prenatal vit-iron fumarate-fa 27 mg iron- 0.8 mg tab tablet Take 1 Tab by mouth daily. No Known Allergies     Lab Results  Component Value Date/Time   WBC 6.6 09/27/2018 03:47 AM   HGB 13.1 09/27/2018 03:47 AM   HCT 41.1 09/27/2018 03:47 AM   PLATELET 830 12/50/5514 03:47 AM   MCV 85 09/27/2018 03:47 AM     Lab Results  Component Value Date/Time   Cholesterol, total 190 09/27/2018 03:47 AM   HDL Cholesterol 59 09/27/2018 03:47 AM   LDL, calculated 115 (H) 09/27/2018 03:47 AM   Triglyceride 76 09/27/2018 03:47 AM     Lab Results  Component Value Date/Time   ALT (SGPT) 13 09/27/2018 03:47 AM   AST (SGOT) 11 09/27/2018 03:47 AM   Alk. phosphatase 57 09/27/2018 03:47 AM   Bilirubin, direct <0.2 09/27/2018 03:47 AM   Bilirubin, total 0.5 09/27/2018 03:47 AM   Albumin 4.5 09/27/2018 03:47 AM   Protein, total 7.3 09/27/2018 03:47 AM   PLATELET 682 37/10/2618 03:47 AM       Lab Results  Component Value Date/Time   TSH, External Normal-1.280 12/18/2017         Specific concerns today: would like HIV testing for possible exposure. Review of Systems  Pertinent items are noted in HPI. Objective:   Blood pressure 126/90, pulse (!) 107, temperature 97.2 °F (36.2 °C), temperature source Oral, resp.  rate 18, height 5' 4\" (1.626 m), weight 160 lb (72.6 kg), last menstrual period 10/01/2018, SpO2 98 %, not currently breastfeeding. Physical Examination:   General appearance - alert, well appearing, and in no distress  Mental status - alert, oriented to person, place, and time  Eyes - pupils equal and reactive, extraocular eye movements intact  Ears - bilateral TM's and external ear canals normal  Nose - normal and patent, no erythema, discharge or polyps  Mouth - mucous membranes moist, pharynx normal without lesions  Neck - supple, no significant adenopathy  Lymphatics - no palpable lymphadenopathy, no hepatosplenomegaly  Chest - clear to auscultation, no wheezes, rales or rhonchi, symmetric air entry  Heart - normal rate, regular rhythm, normal S1, S2, no murmurs, rubs, clicks or gallops  Abdomen - soft, nontender, nondistended, no masses or organomegaly  Back exam - full range of motion, no tenderness, palpable spasm or pain on motion  Neurological - alert, oriented, normal speech, no focal findings or movement disorder noted  Musculoskeletal - no joint tenderness, deformity or swelling  Extremities - peripheral pulses normal, no pedal edema, no clubbing or cyanosis  Skin - mild acne     Assessment/Plan:   Hiv test ordered   Labs revealed low vit d start 5000 units daily OTC and see back in 1 year  lose weight, increase physical activity, follow low fat diet, call if any problems  Encounter Diagnoses   Name Primary?     Routine general medical examination at a health care facility Yes    Screening for HIV (human immunodeficiency virus)     Vitamin D deficiency      Orders Placed This Encounter    HIV 1/2 AG/AB, 4TH GENERATION,W RFLX CONFIRM

## 2018-10-04 NOTE — PROGRESS NOTES
Todd Guerrero is a 33 y.o. female (: 1990) presenting to address:CPE labs have been completed     Chief Complaint   Patient presents with    Complete Physical       Vitals:    10/04/18 1416   BP: 126/90   Pulse: (!) 107   Resp: 18   Temp: 97.2 °F (36.2 °C)   TempSrc: Oral   SpO2: 98%   Weight: 160 lb (72.6 kg)   Height: 5' 4\" (1.626 m)   PainSc:   0 - No pain   LMP: 10/01/2018       Hearing/Vision:   No exam data present    Learning Assessment:     Learning Assessment 2015   PRIMARY LEARNER Patient   HIGHEST LEVEL OF EDUCATION - PRIMARY LEARNER  DID NOT GRADUATE HIGH SCHOOL   BARRIERS PRIMARY LEARNER -   CO-LEARNER CAREGIVER -   PRIMARY LANGUAGE ENGLISH    NEED -   LEARNER PREFERENCE PRIMARY PICTURES   ANSWERED BY patient   RELATIONSHIP SELF     Depression Screening:   No flowsheet data found. Fall Risk Assessment:   No flowsheet data found. Abuse Screening:     Abuse Screening Questionnaire 4/3/2015   Do you ever feel afraid of your partner? N   Are you in a relationship with someone who physically or mentally threatens you? N   Is it safe for you to go home? Y     Coordination of Care Questionaire:   1. Have you been to the ER, urgent care clinic since your last visit? Hospitalized since your last visit? no    2. Have you seen or consulted any other health care providers outside of the [facility] since your last visit? Include any pap smears or colon screening. no    Advanced Directive:   1. Do you have an Advanced Directive? no    2. Would you like information on Advanced Directives? no    Patient declined flu vaccine today.

## 2018-10-04 NOTE — PATIENT INSTRUCTIONS
Well Visit, Ages 25 to 48: Care Instructions  Your Care Instructions    Physical exams can help you stay healthy. Your doctor has checked your overall health and may have suggested ways to take good care of yourself. He or she also may have recommended tests. At home, you can help prevent illness with healthy eating, regular exercise, and other steps. Follow-up care is a key part of your treatment and safety. Be sure to make and go to all appointments, and call your doctor if you are having problems. It's also a good idea to know your test results and keep a list of the medicines you take. How can you care for yourself at home? · Reach and stay at a healthy weight. This will lower your risk for many problems, such as obesity, diabetes, heart disease, and high blood pressure. · Get at least 30 minutes of physical activity on most days of the week. Walking is a good choice. You also may want to do other activities, such as running, swimming, cycling, or playing tennis or team sports. Discuss any changes in your exercise program with your doctor. · Do not smoke or allow others to smoke around you. If you need help quitting, talk to your doctor about stop-smoking programs and medicines. These can increase your chances of quitting for good. · Talk to your doctor about whether you have any risk factors for sexually transmitted infections (STIs). Having one sex partner (who does not have STIs and does not have sex with anyone else) is a good way to avoid these infections. · Use birth control if you do not want to have children at this time. Talk with your doctor about the choices available and what might be best for you. · Protect your skin from too much sun. When you're outdoors from 10 a.m. to 4 p.m., stay in the shade or cover up with clothing and a hat with a wide brim. Wear sunglasses that block UV rays. Even when it's cloudy, put broad-spectrum sunscreen (SPF 30 or higher) on any exposed skin.   · See a dentist one or two times a year for checkups and to have your teeth cleaned. · Wear a seat belt in the car. · Drink alcohol in moderation, if at all. That means no more than 2 drinks a day for men and 1 drink a day for women. Follow your doctor's advice about when to have certain tests. These tests can spot problems early. For everyone  · Cholesterol. Have the fat (cholesterol) in your blood tested after age 21. Your doctor will tell you how often to have this done based on your age, family history, or other things that can increase your risk for heart disease. · Blood pressure. Have your blood pressure checked during a routine doctor visit. Your doctor will tell you how often to check your blood pressure based on your age, your blood pressure results, and other factors. · Vision. Talk with your doctor about how often to have a glaucoma test.  · Diabetes. Ask your doctor whether you should have tests for diabetes. · Colon cancer. Have a test for colon cancer at age 48. You may have one of several tests. If you are younger than 48, you may need a test earlier if you have any risk factors. Risk factors include whether you already had a precancerous polyp removed from your colon or whether your parent, brother, sister, or child has had colon cancer. For women  · Breast exam and mammogram. Talk to your doctor about when you should have a clinical breast exam and a mammogram. Medical experts differ on whether and how often women under 50 should have these tests. Your doctor can help you decide what is right for you. · Pap test and pelvic exam. Begin Pap tests at age 24. A Pap test is the best way to find cervical cancer. The test often is part of a pelvic exam. Ask how often to have this test.  · Tests for sexually transmitted infections (STIs). Ask whether you should have tests for STIs. You may be at risk if you have sex with more than one person, especially if your partners do not wear condoms.   For men  · Tests for sexually transmitted infections (STIs). Ask whether you should have tests for STIs. You may be at risk if you have sex with more than one person, especially if you do not wear a condom. · Testicular cancer exam. Ask your doctor whether you should check your testicles regularly. · Prostate exam. Talk to your doctor about whether you should have a blood test (called a PSA test) for prostate cancer. Experts differ on whether and when men should have this test. Some experts suggest it if you are older than 39 and are -American or have a father or brother who got prostate cancer when he was younger than 72. When should you call for help? Watch closely for changes in your health, and be sure to contact your doctor if you have any problems or symptoms that concern you. Where can you learn more? Go to http://elas-angel luis.info/. Enter P072 in the search box to learn more about \"Well Visit, Ages 25 to 48: Care Instructions. \"  Current as of: March 29, 2018  Content Version: 11.8  © 3689-2580 [employer]. Care instructions adapted under license by [employer] (which disclaims liability or warranty for this information). If you have questions about a medical condition or this instruction, always ask your healthcare professional. [place] [address] any warranty or liability for your use of this information. Vaccine Information Statement    Influenza (Flu) Vaccine (Inactivated or Recombinant): What you need to know    Many Vaccine Information Statements are available in [place]ic and other languages. See www.immunize.org/vis  Hojas de Información Sobre Vacunas están disponibles en Español y en muchos otros idiomas. Visite www.immunize.org/vis    1. Why get vaccinated? Influenza (flu) is a contagious disease that spreads around the [place] every year, usually between October and May.      Flu is caused by influenza viruses, and is spread mainly by coughing, sneezing, and close contact. Anyone can get flu. Flu strikes suddenly and can last several days. Symptoms vary by age, but can include:   fever/chills   sore throat   muscle aches   fatigue   cough   headache    runny or stuffy nose    Flu can also lead to pneumonia and blood infections, and cause diarrhea and seizures in children. If you have a medical condition, such as heart or lung disease, flu can make it worse. Flu is more dangerous for some people. Infants and young children, people 72years of age and older, pregnant women, and people with certain health conditions or a weakened immune system are at greatest risk. Each year thousands of people in the [facility] die from flu, and many more are hospitalized. Flu vaccine can:   keep you from getting flu,   make flu less severe if you do get it, and   keep you from spreading flu to your family and other people. 2. Inactivated and recombinant flu vaccines    A dose of flu vaccine is recommended every flu season. Children 6 months through 6years of age may need two doses during the same flu season. Everyone else needs only one dose each flu season. Some inactivated flu vaccines contain a very small amount of a mercury-based preservative called thimerosal. Studies have not shown thimerosal in vaccines to be harmful, but flu vaccines that do not contain thimerosal are available. There is no live flu virus in flu shots. They cannot cause the flu. There are many flu viruses, and they are always changing. Each year a new flu vaccine is made to protect against three or four viruses that are likely to cause disease in the upcoming flu season. But even when the vaccine doesnt exactly match these viruses, it may still provide some protection    Flu vaccine cannot prevent:   flu that is caused by a virus not covered by the vaccine, or   illnesses that look like flu but are not.     It takes about 2 weeks for protection to develop after vaccination, and protection lasts through the flu season. 3. Some people should not get this vaccine    Tell the person who is giving you the vaccine:     If you have any severe, life-threatening allergies. If you ever had a life-threatening allergic reaction after a dose of flu vaccine, or have a severe allergy to any part of this vaccine, you may be advised not to get vaccinated. Most, but not all, types of flu vaccine contain a small amount of egg protein.  If you ever had Guillain-Barré Syndrome (also called GBS). Some people with a history of GBS should not get this vaccine. This should be discussed with your doctor.  If you are not feeling well. It is usually okay to get flu vaccine when you have a mild illness, but you might be asked to come back when you feel better. 4. Risks of a vaccine reaction    With any medicine, including vaccines, there is a chance of reactions. These are usually mild and go away on their own, but serious reactions are also possible. Most people who get a flu shot do not have any problems with it. Minor problems following a flu shot include:    soreness, redness, or swelling where the shot was given     hoarseness   sore, red or itchy eyes   cough   fever   aches   headache   itching   fatigue  If these problems occur, they usually begin soon after the shot and last 1 or 2 days. More serious problems following a flu shot can include the following:     There may be a small increased risk of Guillain-Barré Syndrome (GBS) after inactivated flu vaccine. This risk has been estimated at 1 or 2 additional cases per million people vaccinated. This is much lower than the risk of severe complications from flu, which can be prevented by flu vaccine.        Young children who get the flu shot along with pneumococcal vaccine (PCV13) and/or DTaP vaccine at the same time might be slightly more likely to have a seizure caused by fever. Ask your doctor for more information. Tell your doctor if a child who is getting flu vaccine has ever had a seizure. Problems that could happen after any injected vaccine:      People sometimes faint after a medical procedure, including vaccination. Sitting or lying down for about 15 minutes can help prevent fainting, and injuries caused by a fall. Tell your doctor if you feel dizzy, or have vision changes or ringing in the ears.  Some people get severe pain in the shoulder and have difficulty moving the arm where a shot was given. This happens very rarely.  Any medication can cause a severe allergic reaction. Such reactions from a vaccine are very rare, estimated at about 1 in a million doses, and would happen within a few minutes to a few hours after the vaccination. As with any medicine, there is a very remote chance of a vaccine causing a serious injury or death. The safety of vaccines is always being monitored. For more information, visit: www.cdc.gov/vaccinesafety/    5. What if there is a serious reaction? What should I look for?  Look for anything that concerns you, such as signs of a severe allergic reaction, very high fever, or unusual behavior. Signs of a severe allergic reaction can include hives, swelling of the face and throat, difficulty breathing, a fast heartbeat, dizziness, and weakness - usually within a few minutes to a few hours after the vaccination. What should I do?  If you think it is a severe allergic reaction or other emergency that cant wait, call 9-1-1 and get the person to the nearest hospital. Otherwise, call your doctor.  Reactions should be reported to the Vaccine Adverse Event Reporting System (VAERS). Your doctor should file this report, or you can do it yourself through  the VAERS web site at www.vaers. [facility].gov, or by calling 8-348.387.2213. VAERS does not give medical advice.     6. The [employer] Injury Compensation Program    The National Vaccine Injury Compensation Program (VICP) is a federal program that was created to compensate people who may have been injured by certain vaccines. Persons who believe they may have been injured by a vaccine can learn about the program and about filing a claim by calling 9-903.607.1697 or visiting the [facility] [employer] website at www.[facility].gov/vaccinecompensation. There is a time limit to file a claim for compensation. 7. How can I learn more?  Ask your healthcare provider. He or she can give you the vaccine package insert or suggest other sources of information.  Call your local or state health department.  Contact the Centers for Disease Control and Prevention (CDC):  - Call 7-487.784.7824 (1-800-CDC-INFO) or  - Visit CDCs website at www.cdc.gov/flu    Vaccine Information Statement   Inactivated Influenza Vaccine   8/7/2015  [address]adon Antis 839ED-02    Department of Health and Human Services  Centers for Disease Control and Prevention    Office Use Only

## 2018-10-06 LAB
HIV -1/0/2 AG/AB WITH REFLEX, 13463: NON REACTIVE
HIV 1 & 2 AB SER-IMP: NORMAL

## 2018-10-09 ENCOUNTER — TELEPHONE (OUTPATIENT)
Dept: FAMILY MEDICINE CLINIC | Age: 28
End: 2018-10-09

## 2018-10-09 NOTE — TELEPHONE ENCOUNTER
Final results received today. Per provider patients results show she is negative for HIV. Will call to advise.

## 2019-11-18 ENCOUNTER — TELEPHONE (OUTPATIENT)
Dept: FAMILY MEDICINE CLINIC | Age: 29
End: 2019-11-18

## 2019-11-18 DIAGNOSIS — Z00.00 ROUTINE GENERAL MEDICAL EXAMINATION AT A HEALTH CARE FACILITY: Primary | ICD-10-CM

## 2020-01-17 LAB
ALBUMIN SERPL-MCNC: 4.6 G/DL (ref 3.5–5.5)
ALP SERPL-CCNC: 53 IU/L (ref 39–117)
ALT SERPL-CCNC: 15 IU/L (ref 0–32)
AST SERPL-CCNC: 17 IU/L (ref 0–40)
BILIRUB DIRECT SERPL-MCNC: 0.13 MG/DL (ref 0–0.4)
BILIRUB SERPL-MCNC: 0.4 MG/DL (ref 0–1.2)
BUN SERPL-MCNC: 7 MG/DL (ref 6–20)
BUN/CREAT SERPL: 10 (ref 9–23)
CALCIUM SERPL-MCNC: 9.5 MG/DL (ref 8.7–10.2)
CHLORIDE SERPL-SCNC: 101 MMOL/L (ref 96–106)
CHOLEST SERPL-MCNC: 195 MG/DL (ref 100–199)
CO2 SERPL-SCNC: 24 MMOL/L (ref 20–29)
CREAT SERPL-MCNC: 0.71 MG/DL (ref 0.57–1)
ERYTHROCYTE [DISTWIDTH] IN BLOOD BY AUTOMATED COUNT: 12.7 % (ref 11.7–15.4)
EST. AVERAGE GLUCOSE BLD GHB EST-MCNC: 103 MG/DL
GLUCOSE SERPL-MCNC: 86 MG/DL (ref 65–99)
HBA1C MFR BLD: 5.2 % (ref 4.8–5.6)
HCT VFR BLD AUTO: 38.8 % (ref 34–46.6)
HDLC SERPL-MCNC: 59 MG/DL
HGB BLD-MCNC: 13.1 G/DL (ref 11.1–15.9)
INTERPRETATION, 910389: NORMAL
LDLC SERPL CALC-MCNC: 126 MG/DL (ref 0–99)
MCH RBC QN AUTO: 29.3 PG (ref 26.6–33)
MCHC RBC AUTO-ENTMCNC: 33.8 G/DL (ref 31.5–35.7)
MCV RBC AUTO: 87 FL (ref 79–97)
PLATELET # BLD AUTO: 411 X10E3/UL (ref 150–450)
POTASSIUM SERPL-SCNC: 4.1 MMOL/L (ref 3.5–5.2)
PROT SERPL-MCNC: 7.3 G/DL (ref 6–8.5)
RBC # BLD AUTO: 4.47 X10E6/UL (ref 3.77–5.28)
SODIUM SERPL-SCNC: 141 MMOL/L (ref 134–144)
T4 FREE SERPL-MCNC: 1.09 NG/DL (ref 0.82–1.77)
TRIGL SERPL-MCNC: 51 MG/DL (ref 0–149)
TSH SERPL DL<=0.005 MIU/L-ACNC: 0.7 UIU/ML (ref 0.45–4.5)
VLDLC SERPL CALC-MCNC: 10 MG/DL (ref 5–40)
WBC # BLD AUTO: 5.7 X10E3/UL (ref 3.4–10.8)

## 2020-01-27 ENCOUNTER — OFFICE VISIT (OUTPATIENT)
Dept: FAMILY MEDICINE CLINIC | Age: 30
End: 2020-01-27

## 2020-01-27 VITALS
DIASTOLIC BLOOD PRESSURE: 60 MMHG | SYSTOLIC BLOOD PRESSURE: 106 MMHG | HEIGHT: 64 IN | RESPIRATION RATE: 14 BRPM | HEART RATE: 107 BPM | BODY MASS INDEX: 24.04 KG/M2 | OXYGEN SATURATION: 97 % | WEIGHT: 140.8 LBS | TEMPERATURE: 97.5 F

## 2020-01-27 DIAGNOSIS — Z00.00 ROUTINE GENERAL MEDICAL EXAMINATION AT A HEALTH CARE FACILITY: Primary | ICD-10-CM

## 2020-01-27 DIAGNOSIS — E55.9 VITAMIN D DEFICIENCY: ICD-10-CM

## 2020-01-27 DIAGNOSIS — Z23 ENCOUNTER FOR IMMUNIZATION: ICD-10-CM

## 2020-01-27 DIAGNOSIS — F31.9 BIPOLAR AFFECTIVE DISORDER, REMISSION STATUS UNSPECIFIED (HCC): ICD-10-CM

## 2020-01-27 DIAGNOSIS — F90.0 ATTENTION DEFICIT HYPERACTIVITY DISORDER (ADHD), PREDOMINANTLY INATTENTIVE TYPE: ICD-10-CM

## 2020-01-27 RX ORDER — QUETIAPINE FUMARATE 300 MG/1
300 TABLET, FILM COATED ORAL
COMMUNITY

## 2020-01-27 RX ORDER — ACETAMINOPHEN 500 MG
TABLET ORAL DAILY
COMMUNITY

## 2020-01-27 NOTE — PATIENT INSTRUCTIONS
Anticipatory guidance and recommendations provided verbally and with printed information. Return for annual physical in 1 year, sooner with any problems. Well Visit, Ages 25 to 48: Care Instructions Your Care Instructions Physical exams can help you stay healthy. Your doctor has checked your overall health and may have suggested ways to take good care of yourself. He or she also may have recommended tests. At home, you can help prevent illness with healthy eating, regular exercise, and other steps. Follow-up care is a key part of your treatment and safety. Be sure to make and go to all appointments, and call your doctor if you are having problems. It's also a good idea to know your test results and keep a list of the medicines you take. How can you care for yourself at home? · Reach and stay at a healthy weight. This will lower your risk for many problems, such as obesity, diabetes, heart disease, and high blood pressure. · Get at least 30 minutes of physical activity on most days of the week. Walking is a good choice. You also may want to do other activities, such as running, swimming, cycling, or playing tennis or team sports. Discuss any changes in your exercise program with your doctor. · Do not smoke or allow others to smoke around you. If you need help quitting, talk to your doctor about stop-smoking programs and medicines. These can increase your chances of quitting for good. · Talk to your doctor about whether you have any risk factors for sexually transmitted infections (STIs). Having one sex partner (who does not have STIs and does not have sex with anyone else) is a good way to avoid these infections. · Use birth control if you do not want to have children at this time. Talk with your doctor about the choices available and what might be best for you. · Protect your skin from too much sun. When you're outdoors from 10 a.m. to 4 p.m., stay in the shade or cover up with clothing and a hat with a wide brim. Wear sunglasses that block UV rays. Even when it's cloudy, put broad-spectrum sunscreen (SPF 30 or higher) on any exposed skin. · See a dentist one or two times a year for checkups and to have your teeth cleaned. · Wear a seat belt in the car. Follow your doctor's advice about when to have certain tests. These tests can spot problems early. For everyone · Cholesterol. Have the fat (cholesterol) in your blood tested after age 21. Your doctor will tell you how often to have this done based on your age, family history, or other things that can increase your risk for heart disease. · Blood pressure. Have your blood pressure checked during a routine doctor visit. Your doctor will tell you how often to check your blood pressure based on your age, your blood pressure results, and other factors. · Vision. Talk with your doctor about how often to have a glaucoma test. 
· Diabetes. Ask your doctor whether you should have tests for diabetes. · Colon cancer. Your risk for colorectal cancer gets higher as you get older. Some experts say that adults should start regular screening at age 48 and stop at age 76. Others say to start before age 48 or continue after age 76. Talk with your doctor about your risk and when to start and stop screening. For women · Breast exam and mammogram. Talk to your doctor about when you should have a clinical breast exam and a mammogram. Medical experts differ on whether and how often women under 50 should have these tests. Your doctor can help you decide what is right for you. · Cervical cancer screening test and pelvic exam. Begin with a Pap test at age 24. The test often is part of a pelvic exam. Starting at age 27, you may choose to have a Pap test, an HPV test, or both tests at the same time (called co-testing). Talk with your doctor about how often to have testing. · Tests for sexually transmitted infections (STIs). Ask whether you should have tests for STIs. You may be at risk if you have sex with more than one person, especially if your partners do not wear condoms. For men · Tests for sexually transmitted infections (STIs). Ask whether you should have tests for STIs. You may be at risk if you have sex with more than one person, especially if you do not wear a condom. · Testicular cancer exam. Ask your doctor whether you should check your testicles regularly. · Prostate exam. Talk to your doctor about whether you should have a blood test (called a PSA test) for prostate cancer. Experts differ on whether and when men should have this test. Some experts suggest it if you are older than 39 and are -American or have a father or brother who got prostate cancer when he was younger than 72. When should you call for help? Watch closely for changes in your health, and be sure to contact your doctor if you have any problems or symptoms that concern you. Where can you learn more? Go to http://elsa-angel luis.info/. Enter P072 in the search box to learn more about \"Well Visit, Ages 25 to 48: Care Instructions. \" Current as of: December 13, 2018 Content Version: 12.2 © 1518-1483 [employer]. Care instructions adapted under license by [employer] (which disclaims liability or warranty for this information). If you have questions about a medical condition or this instruction, always ask your healthcare professional. [place] [address] any warranty or liability for your use of this information.

## 2020-01-27 NOTE — PROGRESS NOTES
HISTORY OF PRESENT ILLNESS  Jolie Quinteros is a 26 y.o. female. Ms. Vin Gibbons presents to establish care and for an annual physical exam following the departure of her previous PCP from the practice. Establish Care   The history is provided by the patient and medical records. Pertinent negatives include no chest pain, no abdominal pain, no headaches and no shortness of breath. Physical   The history is provided by the patient and medical records. Pertinent negatives include no chest pain, no abdominal pain, no headaches and no shortness of breath. Mr#: [id number]      Past Medical History:   Diagnosis Date    ADHD (attention deficit hyperactivity disorder)     Bipolar disorder ([facility] 3/10/2010    Polyphagia(783.6) 3/10/2010       Past Surgical History:   Procedure Laterality Date    HX GYN      D&C       Family History   Problem Relation Age of Onset    Diabetes Maternal Grandfather     No Known Problems Mother     Heart Disease Father     No Known Problems Sister     No Known Problems Brother        No Known Allergies    Social History     Tobacco Use   Smoking Status Current Every Day Smoker    Packs/day: 0.50    Types: Cigarettes   Smokeless Tobacco Never Used       Social History     Substance and Sexual Activity   Alcohol Use No    Comment: occassionally       Health Maintenance Review:  Immunization History   Administered Date(s) Administered    Influenza Vaccine (Quad) PF 10/04/2018    Tdap 05/10/2018     Pap smear - scheduled next month        Patient Active Problem List   Diagnosis Code    Bipolar disorder ([facility] F31.9    ADHD (attention deficit hyperactivity disorder) F90.9    Post term pregnancy O48.0         Current Outpatient Medications:     ibuprofen (MOTRIN) 600 mg tablet, Take 1 Tab by mouth every six (6) hours as needed. , Disp: 90 Tab, Rfl: 0    oxyCODONE-acetaminophen (PERCOCET) 5-325 mg per tablet, Take 1-2 Tabs by mouth every four (4) hours as needed.  Max Daily Amount: 12 Tabs., Disp: 25 Tab, Rfl: 0    dextroamphetamine-amphetamine (ADDERALL) 20 mg tablet, Take 1 Tab (20 mg total) by mouth two (2) times a dayIndications: Attention-Deficit Hyperactivity Disorder. Max Daily Amount: 40 mg, Disp: 60 Tab, Rfl: 0    prenatal vit-iron fumarate-fa 27 mg iron- 0.8 mg tab tablet, Take 1 Tab by mouth daily. , Disp: , Rfl:          Review of Systems   Constitutional: Negative for chills, fever and weight loss. HENT: Negative for congestion, hearing loss and sore throat. Eyes: Negative for blurred vision and double vision. Respiratory: Negative for cough, shortness of breath and wheezing. Cardiovascular: Negative for chest pain, palpitations and leg swelling. Gastrointestinal: Negative for abdominal pain, blood in stool, constipation, diarrhea, heartburn, melena, nausea and vomiting. Genitourinary: Negative for dysuria and urgency. Musculoskeletal: Negative for joint pain and myalgias. Skin: Negative for itching and rash. Neurological: Negative for dizziness, tingling, sensory change, focal weakness and headaches. Endo/Heme/Allergies: Negative for environmental allergies. Psychiatric/Behavioral: Negative for depression and suicidal ideas. The patient is not nervous/anxious and does not have insomnia. Reports that she is doing well on her current medication regimen, followed by psychiatry        Visit Vitals  /60 (BP 1 Location: Left arm, BP Patient Position: Sitting)   Pulse (!) 107   Temp 97.5 °F (36.4 °C) (Oral)   Resp 14   Ht 5' 4\" (1.626 m)   Wt 140 lb 12.8 oz (63.9 kg)   LMP 01/30/2019 (Approximate)   SpO2 97%   Breastfeeding No   BMI 24.17 kg/m²       Physical Exam  Vitals signs and nursing note reviewed. Constitutional:       Appearance: Normal appearance. HENT:      Head: Normocephalic.       Right Ear: Tympanic membrane, ear canal and external ear normal.      Left Ear: Tympanic membrane, ear canal and external ear normal.      Mouth/Throat: Mouth: Mucous membranes are moist.      Pharynx: Oropharynx is clear. Eyes:      Extraocular Movements: Extraocular movements intact. Conjunctiva/sclera: Conjunctivae normal.      Pupils: Pupils are equal, round, and reactive to light. Neck:      Musculoskeletal: Neck supple. Vascular: No carotid bruit. Cardiovascular:      Rate and Rhythm: Normal rate and regular rhythm. Heart sounds: Normal heart sounds. Pulmonary:      Effort: Pulmonary effort is normal.      Breath sounds: Normal breath sounds. Abdominal:      Palpations: Abdomen is soft. Tenderness: There is no tenderness. Musculoskeletal:         General: No deformity. Right lower leg: No edema. Left lower leg: No edema. Skin:     General: Skin is warm and dry. Neurological:      General: No focal deficit present. Mental Status: She is alert and oriented to person, place, and time. Psychiatric:         Mood and Affect: Mood normal.         Behavior: Behavior normal.       Results for Cheryl Valdes (MRN [id number]) as of 1/27/2020 05:54   Ref.  Range 9/27/2018 03:47 10/5/2018 15:19 1/16/2020 12:58   WBC Latest Ref Range: 3.4 - 10.8 x10E3/uL 6.6  5.7   RBC Latest Ref Range: 3.77 - 5.28 x10E6/uL 4.86  4.47   HGB Latest Ref Range: 11.1 - 15.9 g/dL 13.1  13.1   HCT Latest Ref Range: 34.0 - 46.6 % 41.1  38.8   MCV Latest Ref Range: 79 - 97 fL 85  87   MCH Latest Ref Range: 26.6 - 33.0 pg 27  29.3   MCHC Latest Ref Range: 31.5 - 35.7 g/dL 32  33.8   RDW Latest Ref Range: 11.7 - 15.4 % 15.1  12.7   PLATELET Latest Ref Range: 150 - 450 x10E3/uL 391  411   MPV Latest Ref Range: 9.0 - 13.0 fL 10.1     Sodium Latest Ref Range: 134 - 144 mmol/L 140  141   Potassium Latest Ref Range: 3.5 - 5.2 mmol/L 4.0  4.1   Chloride Latest Ref Range: 96 - 106 mmol/L 99  101   CO2 Latest Ref Range: 20 - 29 mmol/L 24  24   Anion gap Latest Units: mmol/L 17.0     Glucose Latest Ref Range: 65 - 99 mg/dL 89  86   BUN Latest Ref Range: 6 - 20 mg/dL 9  7   Creatinine Latest Ref Range: 0.57 - 1.00 mg/dL 0.5  0.71   BUN/Creatinine ratio Latest Ref Range: 9 - 23    10   Calcium Latest Ref Range: 8.7 - 10.2 mg/dL 9.4  9.5   GFR est non-AA Latest Ref Range: >59 mL/min/1.73   115   GFR est AA Latest Ref Range: >59 mL/min/1.73   132   Bilirubin, total Latest Ref Range: 0.0 - 1.2 mg/dL 0.5  0.4   Bilirubin, direct Latest Ref Range: 0.00 - 0.40 mg/dL <0.2  0.13   Protein, total Latest Ref Range: 6.0 - 8.5 g/dL 7.3  7.3   Albumin Latest Ref Range: 3.5 - 5.5 g/dL 4.5  4.6   Globulin Latest Ref Range: 2.0 - 4.0 g/dL 2.8     A-G Ratio Latest Ref Range: 1.1 - 2.6 ratio 1.6     ALT (SGPT) Latest Ref Range: 0 - 32 IU/L 13  15   AST Latest Ref Range: 0 - 40 IU/L 11  17   Alk. phosphatase Latest Ref Range: 39 - 117 IU/L 57  53   Triglyceride Latest Ref Range: 0 - 149 mg/dL 76  51   Cholesterol, total Latest Ref Range: 100 - 199 mg/dL 190  195   HDL Cholesterol Latest Ref Range: >39 mg/dL 59  59   CHOLESTEROL/HDL Latest Ref Range: 0.0 - 5.0  3.2     VLDL, calculated Latest Ref Range: 5 - 40 mg/dL 15  10   LDL, calculated Latest Ref Range: 0 - 99 mg/dL 115 (H)  126 (H)   Hemoglobin A1c, (calculated) Latest Ref Range: 4.8 - 5.6 %   5.2   Estimated average glucose Latest Units: mg/dL   103   T4, Free Latest Ref Range: 0.82 - 1.77 ng/dL   1.09   TSH Latest Ref Range: 0.450 - 4.500 uIU/mL   0.705     ASSESSMENT and PLAN    ICD-10-CM ICD-9-CM    1. Routine general medical examination at a health care facility Z00.00 V70.0    2. Vitamin D deficiency E55.9 268.9    3. Attention deficit hyperactivity disorder (ADHD), predominantly inattentive type F90.0 314.00    4. Bipolar affective disorder, remission status unspecified ([facility] F31.9 296.80    5. Encounter for immunization Z23 V03.89 PNEUMOCOCCAL POLYSACCHARIDE VACCINE, 23-VALENT, ADULT OR IMMUNOSUPPRESSED PT DOSE,   Anticipatory guidance and recommendations provided verbally and with printed information.   Return for annual physical in 1 year, sooner with any problems.

## 2020-01-27 NOTE — PROGRESS NOTES
Dalia Coyne is a 26 y.o. female (: 1990) presenting to address:    Chief Complaint   Patient presents with   [address]     declined flu vaccine    Physical       Vitals:    20 0951   BP: 106/60   Pulse: (!) 107   Resp: 14   Temp: 97.5 °F (36.4 °C)   TempSrc: Oral   SpO2: 97%   Weight: 140 lb 12.8 oz (63.9 kg)   Height: 5' 4\" (1.626 m)   PainSc:   0 - No pain   LMP: 2019       Hearing/Vision:   No exam data present    Learning Assessment:     Learning Assessment 2020   PRIMARY LEARNER Patient   HIGHEST LEVEL OF EDUCATION - PRIMARY LEARNER  DID NOT GRADUATE HIGH SCHOOL   BARRIERS PRIMARY LEARNER NONE   CO-LEARNER CAREGIVER No   PRIMARY LANGUAGE ENGLISH    NEED No   LEARNER PREFERENCE PRIMARY DEMONSTRATION   ANSWERED BY patient   RELATIONSHIP SELF     Depression Screening:   No flowsheet data found. Fall Risk Assessment:     Fall Risk Assessment, last 12 mths 2020   Able to walk? Yes   Fall in past 12 months? No     Abuse Screening:     Abuse Screening Questionnaire 2020   Do you ever feel afraid of your partner? N   Are you in a relationship with someone who physically or mentally threatens you? N   Is it safe for you to go home? Y     Coordination of Care Questionaire:   1. Have you been to the ER, urgent care clinic since your last visit? Hospitalized since your last visit? NO    2. Have you seen or consulted any other health care providers outside of the [address] [place] [address] since your last visit? Include any pap smears or colon screening. YES, OB/GYN    Advanced Directive:   1. Do you have an Advanced Directive? NO    2. Would you like information on Advanced Directives? NO    Patient DECLINED flu vaccine. Immunization of the Pneumovax 23 was administered 2020 by Flako Wang LPN. Patient tolerated procedure well. No reactions noted.

## 2020-05-26 ENCOUNTER — TELEPHONE (OUTPATIENT)
Dept: FAMILY MEDICINE CLINIC | Age: 30
End: 2020-05-26

## 2020-05-26 RX ORDER — DEXTROAMPHETAMINE SACCHARATE, AMPHETAMINE ASPARTATE MONOHYDRATE, DEXTROAMPHETAMINE SULFATE AND AMPHETAMINE SULFATE 7.5; 7.5; 7.5; 7.5 MG/1; MG/1; MG/1; MG/1
30 CAPSULE, EXTENDED RELEASE ORAL
COMMUNITY

## 2020-05-26 NOTE — TELEPHONE ENCOUNTER
Patient called to give medication update; pt taking Adderall 25 mg XR daily; updated in med reconciliation.

## 2020-06-24 ENCOUNTER — TELEPHONE (OUTPATIENT)
Dept: FAMILY MEDICINE CLINIC | Age: 30
End: 2020-06-24

## 2020-06-24 NOTE — TELEPHONE ENCOUNTER
Pt called to add a medication to her med list. She is also taking instant relief adderal 5mg Ifor one tab in the afternoon.

## 2020-06-25 RX ORDER — DEXTROAMPHETAMINE SACCHARATE, AMPHETAMINE ASPARTATE, DEXTROAMPHETAMINE SULFATE AND AMPHETAMINE SULFATE 1.25; 1.25; 1.25; 1.25 MG/1; MG/1; MG/1; MG/1
5 TABLET ORAL
COMMUNITY

## 2020-09-22 NOTE — PROGRESS NOTES
HISTORY OF PRESENT ILLNESS  Carlos Long is a 31 y.o. female. She reports concern about a lesion on her left hip/leg present for about 2 weeks. Mr#: [id number]      Patient Active Problem List   Diagnosis Code    [facility] F31.9    ADHD (attention deficit hyperactivity disorder) F90.9    Post term pregnancy O48.0         Current Outpatient Medications:     dextroamphetamine-amphetamine (AdderalL) 5 mg tablet, Take 5 mg by mouth. Patient states she is taking adderall 5 mg, one half tab in the afternoon. , Disp: , Rfl:     amphetamine-dextroamphetamine XR (Adderall XR) 25 mg XR capsule, Take 25 mg by mouth every morning., Disp: , Rfl:     QUEtiapine (SEROQUEL) 400 mg tablet, Take 400 mg by mouth nightly., Disp: , Rfl:     cholecalciferol (VITAMIN D3) (2,000 UNITS /50 MCG) cap capsule, Take  by mouth daily. , Disp: , Rfl:      No Known Allergies    Review of Systems   Constitutional: Negative for fever and weight loss. Respiratory: Negative for shortness of breath. Cardiovascular: Negative for chest pain and palpitations. Gastrointestinal: Negative for abdominal pain. Skin:        She reports a lesion on her right thigh present for 2 to 3 weeks, tender to touch but not otherwise painful     Visit Vitals  /66 (BP 1 Location: Left arm, BP Patient Position: Sitting)   Pulse 90   Temp 97.2 °F (36.2 °C) (Temporal)   Resp 13   Ht 5' 4\" (1.626 m)   Wt 142 lb 3.2 oz (64.5 kg)   LMP 09/22/2020 (Exact Date)   SpO2 100%   Breastfeeding No   BMI 24.41 kg/m²       Physical Exam  Vitals signs and nursing note reviewed. Constitutional:       General: She is not in acute distress. Appearance: Normal appearance. She is well-developed. She is not ill-appearing. HENT:      Head: Normocephalic. Eyes:      Conjunctiva/sclera: Conjunctivae normal.   Neck:      Musculoskeletal: Neck supple. Cardiovascular:      Rate and Rhythm: Normal rate.    Pulmonary:      Effort: Pulmonary effort is normal.   Skin:     General: Skin is warm and dry. Findings: Lesion ( Purple papule with superior ulcerated area proximal right lateral thigh) present. Neurological:      Mental Status: She is alert and oriented to person, place, and time. Psychiatric:         Behavior: Behavior normal.         ASSESSMENT and PLAN    ICD-10-CM ICD-9-CM    1. Skin lesion of right leg  L98.9 709.9 REFERRAL TO DERMATOLOGY   2. Needs flu shot  Z23 V04.81 INFLUENZA VIRUS VAC QUAD,SPLIT,PRESV FREE SYRINGE IM     Health maintenance:    Dermatology referral    PLEASE NOTE:   This document has been produced using voice recognition software. Unrecognized errors in transcription may be present.

## 2020-09-23 ENCOUNTER — OFFICE VISIT (OUTPATIENT)
Dept: FAMILY MEDICINE CLINIC | Age: 30
End: 2020-09-23
Payer: COMMERCIAL

## 2020-09-23 VITALS
RESPIRATION RATE: 13 BRPM | TEMPERATURE: 97.2 F | BODY MASS INDEX: 24.28 KG/M2 | HEART RATE: 90 BPM | DIASTOLIC BLOOD PRESSURE: 66 MMHG | SYSTOLIC BLOOD PRESSURE: 106 MMHG | HEIGHT: 64 IN | WEIGHT: 142.2 LBS | OXYGEN SATURATION: 100 %

## 2020-09-23 DIAGNOSIS — Z23 NEEDS FLU SHOT: ICD-10-CM

## 2020-09-23 DIAGNOSIS — L98.9 SKIN LESION OF RIGHT LEG: Primary | ICD-10-CM

## 2020-09-23 PROCEDURE — 99213 OFFICE O/P EST LOW 20 MIN: CPT | Performed by: FAMILY MEDICINE

## 2020-09-23 PROCEDURE — 90471 IMMUNIZATION ADMIN: CPT | Performed by: FAMILY MEDICINE

## 2020-09-23 PROCEDURE — 90686 IIV4 VACC NO PRSV 0.5 ML IM: CPT | Performed by: FAMILY MEDICINE

## 2020-09-23 NOTE — PROGRESS NOTES
Ray Loew is a 31 y.o. female (: 1990) presenting to address:    Chief Complaint   Patient presents with    Leg Problem     spot on right leg/hip x 2 weeks; not painful, little bleeding       Vitals:    20 0848   BP: 106/66   Pulse: 90   Resp: 13   Temp: 97.2 °F (36.2 °C)   TempSrc: Temporal   SpO2: 100%   Weight: 142 lb 3.2 oz (64.5 kg)   Height: 5' 4\" (1.626 m)   PainSc:   0 - No pain   LMP: 2020       Hearing/Vision:   No exam data present    Learning Assessment:     Learning Assessment 2020   PRIMARY LEARNER Patient   HIGHEST LEVEL OF EDUCATION - PRIMARY LEARNER  DID NOT GRADUATE HIGH SCHOOL   BARRIERS PRIMARY LEARNER NONE   CO-LEARNER CAREGIVER No   PRIMARY LANGUAGE ENGLISH    NEED No   LEARNER PREFERENCE PRIMARY DEMONSTRATION   ANSWERED BY patient   RELATIONSHIP SELF     Depression Screening:   No flowsheet data found. Fall Risk Assessment:     Fall Risk Assessment, last 12 mths 2020   Able to walk? Yes   Fall in past 12 months? No     Abuse Screening:     Abuse Screening Questionnaire 2020   Do you ever feel afraid of your partner? N   Are you in a relationship with someone who physically or mentally threatens you? N   Is it safe for you to go home? Y     Coordination of Care Questionaire:   1. Have you been to the ER, urgent care clinic since your last visit? Hospitalized since your last visit? NO    2. Have you seen or consulted any other health care providers outside of the [address] [place] [address] since your last visit? Include any pap smears or colon screening. YES, psychiatrist/therapist    Advanced Directive:   1. Do you have an Advanced Directive? NO    2. Would you like information on Advanced Directives? NO      Immunization of the Flu vaccine was administered 2020 by Geneva Chen LPN. Patient tolerated procedure well. No reactions noted.

## 2020-09-29 ENCOUNTER — TELEPHONE (OUTPATIENT)
Dept: FAMILY MEDICINE CLINIC | Age: 30
End: 2020-09-29

## 2020-09-29 NOTE — TELEPHONE ENCOUNTER
Patient called stating that the dermatologist that Vaishali Coats sent a referral to for her does not accept her insurance.

## 2020-12-14 ENCOUNTER — TELEPHONE (OUTPATIENT)
Dept: FAMILY MEDICINE CLINIC | Age: 30
End: 2020-12-14

## 2021-01-13 ENCOUNTER — TELEPHONE (OUTPATIENT)
Dept: FAMILY MEDICINE CLINIC | Age: 31
End: 2021-01-13

## 2021-01-13 RX ORDER — HYDROXYZINE PAMOATE 25 MG/1
25 CAPSULE ORAL
COMMUNITY

## 2021-01-13 NOTE — TELEPHONE ENCOUNTER
Pt would like to add this medication to her my Chart for her own records. Please advise. VISTARIL 25mg up to X2 daily as needed.

## 2021-01-28 ENCOUNTER — OFFICE VISIT (OUTPATIENT)
Dept: FAMILY MEDICINE CLINIC | Age: 31
End: 2021-01-28
Payer: COMMERCIAL

## 2021-01-28 ENCOUNTER — APPOINTMENT (OUTPATIENT)
Dept: FAMILY MEDICINE CLINIC | Age: 31
End: 2021-01-28

## 2021-01-28 VITALS
BODY MASS INDEX: 24.04 KG/M2 | SYSTOLIC BLOOD PRESSURE: 116 MMHG | WEIGHT: 140.8 LBS | DIASTOLIC BLOOD PRESSURE: 60 MMHG | TEMPERATURE: 98.4 F | HEART RATE: 94 BPM | RESPIRATION RATE: 13 BRPM | OXYGEN SATURATION: 100 % | HEIGHT: 64 IN

## 2021-01-28 DIAGNOSIS — Z00.00 ROUTINE GENERAL MEDICAL EXAMINATION AT A HEALTH CARE FACILITY: ICD-10-CM

## 2021-01-28 DIAGNOSIS — Z13.220 ENCOUNTER FOR LIPID SCREENING FOR CARDIOVASCULAR DISEASE: ICD-10-CM

## 2021-01-28 DIAGNOSIS — F90.0 ATTENTION DEFICIT HYPERACTIVITY DISORDER (ADHD), PREDOMINANTLY INATTENTIVE TYPE: ICD-10-CM

## 2021-01-28 DIAGNOSIS — Z13.6 ENCOUNTER FOR LIPID SCREENING FOR CARDIOVASCULAR DISEASE: ICD-10-CM

## 2021-01-28 DIAGNOSIS — F31.9 BIPOLAR AFFECTIVE DISORDER, REMISSION STATUS UNSPECIFIED (HCC): ICD-10-CM

## 2021-01-28 DIAGNOSIS — E55.9 VITAMIN D DEFICIENCY: ICD-10-CM

## 2021-01-28 DIAGNOSIS — Z00.00 ROUTINE GENERAL MEDICAL EXAMINATION AT A HEALTH CARE FACILITY: Primary | ICD-10-CM

## 2021-01-28 PROCEDURE — 99395 PREV VISIT EST AGE 18-39: CPT | Performed by: FAMILY MEDICINE

## 2021-01-28 NOTE — PATIENT INSTRUCTIONS
Pap smear-scheduled next month Lab today, further disposition pending lab results if indicated Continue psychiatry follow-up Return for annual physical exam and follow-up in 1 year, sooner with any problems

## 2021-01-28 NOTE — PROGRESS NOTES
HISTORY OF PRESENT ILLNESS  Ang Dove is a 31 y.o. female. She presents for health assessment, preventative care and follow-up with a history of vitamin D deficiency, attention deficit disorder and bipolar disorder followed by psychiatry    Mr#: [id number]      Past Medical History:   Diagnosis Date    ADHD (attention deficit hyperactivity disorder)     Bipolar disorder ([facility] 3/10/2010    Polyphagia(783.6) 3/10/2010       Past Surgical History:   Procedure Laterality Date    HX GYN      D&C       Family History   Problem Relation Age of Onset    Diabetes Maternal Grandfather     No Known Problems Mother     No Known Problems Sister     No Known Problems Brother        No Known Allergies    Social History     Tobacco Use   Smoking Status Former Smoker    Packs/day: 0.50    Types: Cigarettes   Smokeless Tobacco Never Used   Tobacco Comment    stopped smoking beginning of september 2019       Social History     Substance and Sexual Activity   Alcohol Use No    Comment: occassionally         Patient Active Problem List   Diagnosis Code    Bipolar disorder ([facility] F31.9    ADHD (attention deficit hyperactivity disorder) F90.9    Post term pregnancy O48.0         Current Outpatient Medications:     hydrOXYzine pamoate (VistariL) 25 mg capsule, Take 25 mg by mouth two (2) times daily as needed. , Disp: , Rfl:     dextroamphetamine-amphetamine (AdderalL) 5 mg tablet, Take 5 mg by mouth. Patient states she is taking adderall 5 mg, one half tab in the afternoon. , Disp: , Rfl:     amphetamine-dextroamphetamine XR (Adderall XR) 25 mg XR capsule, Take 30 mg by mouth every morning. Per patient dosage changed from 25 mg to 30 mg by psychiatrist., Disp: , Rfl:     QUEtiapine (SEROQUEL) 400 mg tablet, Take 400 mg by mouth nightly., Disp: , Rfl:     cholecalciferol (VITAMIN D3) (2,000 UNITS /50 MCG) cap capsule, Take  by mouth daily. , Disp: , Rfl:        Review of Systems   Constitutional: Negative for fever, malaise/fatigue and weight loss. HENT: Negative for congestion, ear pain, hearing loss and sore throat. Eyes: Negative for blurred vision. Respiratory: Negative for cough, shortness of breath and wheezing. Cardiovascular: Negative for chest pain, palpitations, orthopnea and leg swelling. Gastrointestinal: Negative for abdominal pain, blood in stool, constipation, diarrhea, heartburn, melena, nausea and vomiting. Genitourinary: Negative for dysuria, frequency, hematuria and urgency. Skin: Negative for itching and rash. Neurological: Negative for dizziness, tingling, sensory change, focal weakness and headaches. Endo/Heme/Allergies: Negative for environmental allergies. Psychiatric/Behavioral: Negative for depression and suicidal ideas. The patient is not nervous/anxious. She reports that she is doing well with regarding bipolar disorder and ADHD and in fact her psychiatrist plans to decrease her Seroquel dose at her follow-up appointment in March     Visit Vitals  /60 (BP 1 Location: Left arm, BP Patient Position: Sitting)   Pulse 94   Temp 98.4 °F (36.9 °C) (Temporal)   Resp 13   Ht 5' 4\" (1.626 m)   Wt 140 lb 12.8 oz (63.9 kg)   LMP 01/04/2021 (Approximate)   SpO2 100%   Breastfeeding No   BMI 24.17 kg/m²       Physical Exam  Vitals signs and nursing note reviewed. Constitutional:       General: She is not in acute distress. Appearance: Normal appearance. She is not ill-appearing. HENT:      Head: Normocephalic. Right Ear: Tympanic membrane, ear canal and external ear normal.      Left Ear: Tympanic membrane, ear canal and external ear normal.   Eyes:      Extraocular Movements: Extraocular movements intact. Conjunctiva/sclera: Conjunctivae normal.      Pupils: Pupils are equal, round, and reactive to light. Neck:      Musculoskeletal: Neck supple. Vascular: No carotid bruit. Cardiovascular:      Rate and Rhythm: Normal rate and regular rhythm. Heart sounds: Normal heart sounds. Pulmonary:      Effort: Pulmonary effort is normal.      Breath sounds: Normal breath sounds. Abdominal:      Palpations: Abdomen is soft. Tenderness: There is no abdominal tenderness. Musculoskeletal:         General: No deformity. Right lower leg: No edema. Left lower leg: No edema. Skin:     General: Skin is warm and dry. Neurological:      Mental Status: She is alert and oriented to person, place, and time. Psychiatric:         Mood and Affect: Mood normal.         Behavior: Behavior normal.         ASSESSMENT and PLAN    ICD-10-CM ICD-9-CM    1. Routine general medical examination at a health care facility  Z00.00 V70.0 CBC WITH AUTOMATED DIFF      HEMOGLOBIN A1C WITH EAG      URINALYSIS W/ RFLX MICROSCOPIC      TSH 3RD GENERATION      METABOLIC PANEL, COMPREHENSIVE      LIPID PANEL      VITAMIN D, 25 HYDROXY   2. Vitamin D deficiency  E55.9 268.9 VITAMIN D, 25 HYDROXY   3. Attention deficit hyperactivity disorder (ADHD), predominantly inattentive type  F90.0 314.00    4. Bipolar affective disorder, remission status unspecified ([facility]  F31.9 296.80 CBC WITH AUTOMATED DIFF      HEMOGLOBIN A1C WITH EAG      URINALYSIS W/ RFLX MICROSCOPIC      TSH 3RD GENERATION      METABOLIC PANEL, COMPREHENSIVE   5. Encounter for lipid screening for cardiovascular disease  Z13.220 V77.91 LIPID PANEL    Z13.6 V81.2    Health maintenance:  Pap smear-scheduled next month    Lab today, further disposition pending lab results if indicated  Continue psychiatry follow-up  Return for annual physical exam and follow-up in 1 year, sooner with any problems      Monica Johnson MD      PLEASE NOTE:   This document has been produced using voice recognition software. Unrecognized errors in transcription may be present.

## 2021-01-28 NOTE — PROGRESS NOTES
Alethea Doyle is a 31 y.o. female (: 1990) presenting to address:    Chief Complaint   Patient presents with    Physical       Vitals:    21 1117   BP: 116/60   Pulse: 94   Resp: 13   Temp: 98.4 °F (36.9 °C)   TempSrc: Temporal   SpO2: 100%   Weight: 140 lb 12.8 oz (63.9 kg)   Height: 5' 4\" (1.626 m)   PainSc:   0 - No pain   LMP: 2021       Hearing/Vision:   No exam data present    Learning Assessment:     Learning Assessment 2020   PRIMARY LEARNER Patient   HIGHEST LEVEL OF EDUCATION - PRIMARY LEARNER  DID NOT GRADUATE HIGH SCHOOL   BARRIERS PRIMARY LEARNER NONE   CO-LEARNER CAREGIVER No   PRIMARY LANGUAGE ENGLISH    NEED No   LEARNER PREFERENCE PRIMARY DEMONSTRATION   ANSWERED BY patient   RELATIONSHIP SELF     Depression Screening:   No flowsheet data found. Fall Risk Assessment:     Fall Risk Assessment, last 12 mths 2020   Able to walk? Yes   Fall in past 12 months? No     Abuse Screening:     Abuse Screening Questionnaire 2020   Do you ever feel afraid of your partner? N   Are you in a relationship with someone who physically or mentally threatens you? N   Is it safe for you to go home? Y     Coordination of Care Questionaire:   1. Have you been to the ER, urgent care clinic since your last visit? Hospitalized since your last visit? NO    2. Have you seen or consulted any other health care providers outside of the [address] [place] [address] since your last visit? Include any pap smears or colon screening. NO    Advanced Directive:   1. Do you have an Advanced Directive? NO    2. Would you like information on Advanced Directives?  NO

## 2021-01-29 LAB
25(OH)D3+25(OH)D2 SERPL-MCNC: 31 NG/ML (ref 30–100)
ALBUMIN SERPL-MCNC: 4.4 G/DL (ref 3.8–4.8)
ALBUMIN/GLOB SERPL: 1.6 {RATIO} (ref 1.2–2.2)
ALP SERPL-CCNC: 45 IU/L (ref 39–117)
ALT SERPL-CCNC: 19 IU/L (ref 0–32)
APPEARANCE UR: CLEAR
AST SERPL-CCNC: 20 IU/L (ref 0–40)
BASOPHILS # BLD AUTO: 0 X10E3/UL (ref 0–0.2)
BASOPHILS NFR BLD AUTO: 1 %
BILIRUB SERPL-MCNC: 0.4 MG/DL (ref 0–1.2)
BILIRUB UR QL STRIP: NEGATIVE
BUN SERPL-MCNC: 8 MG/DL (ref 6–20)
BUN/CREAT SERPL: 13 (ref 9–23)
CALCIUM SERPL-MCNC: 9.4 MG/DL (ref 8.7–10.2)
CHLORIDE SERPL-SCNC: 103 MMOL/L (ref 96–106)
CHOLEST SERPL-MCNC: 162 MG/DL (ref 100–199)
CO2 SERPL-SCNC: 25 MMOL/L (ref 20–29)
COLOR UR: YELLOW
CREAT SERPL-MCNC: 0.6 MG/DL (ref 0.57–1)
EOSINOPHIL # BLD AUTO: 0.1 X10E3/UL (ref 0–0.4)
EOSINOPHIL NFR BLD AUTO: 1 %
ERYTHROCYTE [DISTWIDTH] IN BLOOD BY AUTOMATED COUNT: 12.2 % (ref 11.7–15.4)
EST. AVERAGE GLUCOSE BLD GHB EST-MCNC: 100 MG/DL
GLOBULIN SER CALC-MCNC: 2.7 G/DL (ref 1.5–4.5)
GLUCOSE SERPL-MCNC: 80 MG/DL (ref 65–99)
GLUCOSE UR QL: NEGATIVE
HBA1C MFR BLD: 5.1 % (ref 4.8–5.6)
HCT VFR BLD AUTO: 40.6 % (ref 34–46.6)
HDLC SERPL-MCNC: 80 MG/DL
HGB BLD-MCNC: 13.3 G/DL (ref 11.1–15.9)
HGB UR QL STRIP: NEGATIVE
IMM GRANULOCYTES # BLD AUTO: 0 X10E3/UL (ref 0–0.1)
IMM GRANULOCYTES NFR BLD AUTO: 0 %
INTERPRETATION, 910389: NORMAL
KETONES UR QL STRIP: NEGATIVE
LDLC SERPL CALC-MCNC: 70 MG/DL (ref 0–99)
LEUKOCYTE ESTERASE UR QL STRIP: NEGATIVE
LYMPHOCYTES # BLD AUTO: 2 X10E3/UL (ref 0.7–3.1)
LYMPHOCYTES NFR BLD AUTO: 44 %
MCH RBC QN AUTO: 29.2 PG (ref 26.6–33)
MCHC RBC AUTO-ENTMCNC: 32.8 G/DL (ref 31.5–35.7)
MCV RBC AUTO: 89 FL (ref 79–97)
MICRO URNS: ABNORMAL
MONOCYTES # BLD AUTO: 0.5 X10E3/UL (ref 0.1–0.9)
MONOCYTES NFR BLD AUTO: 11 %
NEUTROPHILS # BLD AUTO: 2 X10E3/UL (ref 1.4–7)
NEUTROPHILS NFR BLD AUTO: 43 %
NITRITE UR QL STRIP: NEGATIVE
PH UR STRIP: 8.5 [PH] (ref 5–7.5)
PLATELET # BLD AUTO: 338 X10E3/UL (ref 150–450)
POTASSIUM SERPL-SCNC: 4.3 MMOL/L (ref 3.5–5.2)
PROT SERPL-MCNC: 7.1 G/DL (ref 6–8.5)
PROT UR QL STRIP: NEGATIVE
RBC # BLD AUTO: 4.56 X10E6/UL (ref 3.77–5.28)
SODIUM SERPL-SCNC: 139 MMOL/L (ref 134–144)
SP GR UR: 1.02 (ref 1–1.03)
TRIGL SERPL-MCNC: 61 MG/DL (ref 0–149)
TSH SERPL DL<=0.005 MIU/L-ACNC: 1.86 UIU/ML (ref 0.45–4.5)
UROBILINOGEN UR STRIP-MCNC: 0.2 MG/DL (ref 0.2–1)
VLDLC SERPL CALC-MCNC: 12 MG/DL (ref 5–40)
WBC # BLD AUTO: 4.7 X10E3/UL (ref 3.4–10.8)

## 2021-03-01 ENCOUNTER — TELEPHONE (OUTPATIENT)
Dept: FAMILY MEDICINE CLINIC | Age: 31
End: 2021-03-01

## 2021-03-01 NOTE — TELEPHONE ENCOUNTER
Pt called to let nurse know that Seroquel has changed to 300 mg. She would like this updated in [address]

## 2021-03-03 ENCOUNTER — TELEPHONE (OUTPATIENT)
Dept: FAMILY MEDICINE CLINIC | Age: 31
End: 2021-03-03

## 2021-03-03 NOTE — TELEPHONE ENCOUNTER
Pt called because she is wanting to be allergy tested because she has never had one and she would like to see someone for that preferably in the [facility]. She has [employer]. Please advise.

## 2021-03-03 NOTE — TELEPHONE ENCOUNTER
Pt scheduled to discuss request for allergy testing:  Future Appointments   Date Time Provider Maegan Lissa   4/5/2021  8:30 AM Sabino Ventura MD [facility] BS AMB

## 2021-03-03 NOTE — TELEPHONE ENCOUNTER
Please advise that anxiety is not a reasonable indication for referral for allergy evaluation. If she would like to schedule an appointment I would be glad to discuss anxiety and allergy issues with her.

## 2021-03-03 NOTE — TELEPHONE ENCOUNTER
Spoke w/pt and she states requesting an allergy test d/t having anxiety issues and just for her to know whether or not she is allergic to anything; currently chart states NKDA.

## 2021-04-03 NOTE — PROGRESS NOTES
HISTORY OF PRESENT ILLNESS  Brittany Pina is a 27 y.o. female. Ms. Ruby Nobles presents to discuss whether or not referral for allergy testing would be appropriate as a way to further evaluate her, anxiety/panic symptoms. She has also noted the recommendation for hepatitis C screening on my chart and would like to have that done. She specifically denies having any symptoms that would expect to be associated with an allergic etiology. Mr#: [id number]      Patient Active Problem List   Diagnosis Code    [facility] ([facility] F31.9    ADHD (attention deficit hyperactivity disorder) F90.9    Post term pregnancy O48.0         Current Outpatient Medications:     hydrOXYzine pamoate (VistariL) 25 mg capsule, Take 25 mg by mouth two (2) times daily as needed. , Disp: , Rfl:     dextroamphetamine-amphetamine (AdderalL) 5 mg tablet, Take 5 mg by mouth. Patient states she is taking adderall 5 mg, one half tab in the afternoon. , Disp: , Rfl:     amphetamine-dextroamphetamine XR (Adderall XR) 25 mg XR capsule, Take 30 mg by mouth every morning. Per patient dosage changed from 25 mg to 30 mg by psychiatrist., Disp: , Rfl:     QUEtiapine (SEROQUEL) 400 mg tablet, Take 300 mg by mouth nightly. Pt left message on 3/1/2021, dosage was changed from 400 mg to 300 mg., Disp: , Rfl:     cholecalciferol (VITAMIN D3) (2,000 UNITS /50 MCG) cap capsule, Take  by mouth daily. , Disp: , Rfl:      No Known Allergies    Review of Systems   Endo/Heme/Allergies: Negative for environmental allergies. Psychiatric/Behavioral: Positive for depression. Negative for suicidal ideas. The patient is nervous/anxious. Sees Zayra Chavez-psychiatry    Feels she is doing well on current medications and notes that in fact her anxiety/panic symptoms are improving    She relates stress and concern associated with child custody issues.        Visit Vitals  /72 (BP 1 Location: Right arm, BP Patient Position: Sitting, BP Cuff Size: Adult)   Pulse (!) 102   Temp 97.7 °F (36.5 °C) (Temporal)   Resp 14   Ht 5' 4\" (1.626 m)   Wt 136 lb 9.6 oz (62 kg)   LMP 03/07/2021 (Exact Date)   SpO2 99%   BMI 23.45 kg/m²       Physical Exam  Vitals signs and nursing note reviewed. Constitutional:       General: She is not in acute distress. Appearance: Normal appearance. She is well-developed and normal weight. She is not ill-appearing. HENT:      Head: Normocephalic. Eyes:      Extraocular Movements: Extraocular movements intact. Neck:      Musculoskeletal: Neck supple. Cardiovascular:      Rate and Rhythm: Normal rate. Pulmonary:      Effort: Pulmonary effort is normal.   Skin:     General: Skin is warm and dry. Neurological:      Mental Status: She is alert and oriented to person, place, and time. Psychiatric:         Mood and Affect: Mood normal.         Behavior: Behavior normal.         ASSESSMENT and PLAN    ICD-10-CM ICD-9-CM    1. Bipolar affective disorder, remission status unspecified ([facility]  F31.9 296.80    2. Attention deficit hyperactivity disorder (ADHD), predominantly inattentive type  F90.0 314.00    3. Need for hepatitis C screening test  Z11.59 V73.89 HEPATITIS C AB   Health maintenance:  Hepatitis C screening  Pap smear-2/16/2021-colposcopy done 3/30/2021  Had HPV#1 3/31/2021    Referral for allergy testing would not be recommended at this time since she does not report symptoms that would be related to an allergic etiology. Hepatitis C antibody drawn today  Return for annual physical exam and follow-up in January, return sooner with any problems  Ninfa Tamayo MD      PLEASE NOTE:   This document has been produced using voice recognition software. Unrecognized errors in transcription may be present.

## 2021-04-05 ENCOUNTER — OFFICE VISIT (OUTPATIENT)
Dept: FAMILY MEDICINE CLINIC | Age: 31
End: 2021-04-05
Payer: COMMERCIAL

## 2021-04-05 ENCOUNTER — APPOINTMENT (OUTPATIENT)
Dept: FAMILY MEDICINE CLINIC | Age: 31
End: 2021-04-05

## 2021-04-05 VITALS
SYSTOLIC BLOOD PRESSURE: 118 MMHG | RESPIRATION RATE: 14 BRPM | HEART RATE: 102 BPM | TEMPERATURE: 97.7 F | WEIGHT: 136.6 LBS | BODY MASS INDEX: 23.32 KG/M2 | OXYGEN SATURATION: 99 % | HEIGHT: 64 IN | DIASTOLIC BLOOD PRESSURE: 72 MMHG

## 2021-04-05 DIAGNOSIS — Z11.59 NEED FOR HEPATITIS C SCREENING TEST: ICD-10-CM

## 2021-04-05 DIAGNOSIS — F31.9 BIPOLAR AFFECTIVE DISORDER, REMISSION STATUS UNSPECIFIED (HCC): Primary | ICD-10-CM

## 2021-04-05 DIAGNOSIS — F90.0 ATTENTION DEFICIT HYPERACTIVITY DISORDER (ADHD), PREDOMINANTLY INATTENTIVE TYPE: ICD-10-CM

## 2021-04-05 PROCEDURE — 99213 OFFICE O/P EST LOW 20 MIN: CPT | Performed by: FAMILY MEDICINE

## 2021-04-05 NOTE — PROGRESS NOTES
Komal Juan presents today for   Chief Complaint   Patient presents with    Allergies     allergy testing questions        Is someone accompanying this pt? no    Is the patient using any DME equipment during OV? no    Depression Screening:  3 most recent PHQ Screens 4/5/2021   Little interest or pleasure in doing things Not at all   Feeling down, depressed, irritable, or hopeless Not at all   Total Score PHQ 2 0       Learning Assessment:  Learning Assessment 1/28/2021   PRIMARY LEARNER Patient   HIGHEST LEVEL OF EDUCATION - PRIMARY LEARNER  DID NOT GRADUATE [facility] PRIMARY LEARNER NONE   CO-LEARNER CAREGIVER No   PRIMARY LANGUAGE ENGLISH    NEED No   LEARNER PREFERENCE PRIMARY DEMONSTRATION   ANSWERED BY patient   RELATIONSHIP SELF       Travel Screening:    Travel Screening     Question   Response    In the last month, have you been in contact with someone who was confirmed or suspected to have Claire Gift / COVID-19? No / Unsure    Have you had a COVID-19 viral test in the last 14 days? No    Do you have any of the following new or worsening symptoms? None of these    Have you traveled internationally or domestically in the last month? No      Travel History   Travel since 03/05/21     No documented travel since 03/05/21          Health Maintenance reviewed and discussed and ordered per Provider. Health Maintenance Due   Topic Date Due    Hepatitis C Screening  Never done    PAP AKA CERVICAL CYTOLOGY  Never done   . Coordination of Care:  1. Have you been to the ER, urgent care clinic since your last visit? Hospitalized since your last visit? no    2. Have you seen or consulted any other health care providers outside of the [address] [place] [address] since your last visit? Include any pap smears or colon screening. Yes.

## 2021-04-06 LAB — HCV AB S/CO SERPL IA: <0.1 S/CO RATIO (ref 0–0.9)

## 2021-08-09 ENCOUNTER — TELEPHONE (OUTPATIENT)
Dept: FAMILY MEDICINE CLINIC | Age: 31
End: 2021-08-09

## 2021-08-09 NOTE — TELEPHONE ENCOUNTER
Pt would like to speak to the nurse about updating her records. She has a new therapist that is in the same building as her psychiatrist. Her psychiatrist info is not up to date either. Please return her call at the earliest convenience.

## 2021-08-09 NOTE — TELEPHONE ENCOUNTER
Spoke w/ pt she is no longer going to [facility] and is now going to [facility], which is the same building for her counseling and psychiatrist .   [facility] counseling   [facility], [address] [place] [address]  Phone: [phone]     Psychiatrist:  Oswald Stratton    Counselor:    Apoorva Montoya

## 2021-10-18 ENCOUNTER — TELEPHONE (OUTPATIENT)
Dept: FAMILY MEDICINE CLINIC | Age: 31
End: 2021-10-18

## 2021-10-18 NOTE — TELEPHONE ENCOUNTER
Pt called to update us that her adderall script has changed to taking the full 5 mg tablets in the afternoon or evening instead of halving the 5mg.  Please update med list

## 2021-10-26 ENCOUNTER — TELEPHONE (OUTPATIENT)
Dept: FAMILY MEDICINE CLINIC | Age: 31
End: 2021-10-26

## 2021-10-26 NOTE — TELEPHONE ENCOUNTER
Pt would like a call back in regards to a health concern question. Pt did not disclose the reason for phone call.  Please advise

## 2021-10-26 NOTE — TELEPHONE ENCOUNTER
I called Patient and told her if she is concerned then Dr Roe Lynne suggests she make an appointment . I offered to schedule but said she will continue to monitor . If issue continues she will call for appointment .

## 2021-10-26 NOTE — TELEPHONE ENCOUNTER
I spoke with patient this morning she had blood in her stool says it was in the toilet and it was bright red . Said she was constipated recently but not this morning and there was more blood then she has had in previous times describes it like a period . She denies any abdominal pain , nausea , vomiting .

## 2021-11-10 ENCOUNTER — TELEPHONE (OUTPATIENT)
Dept: FAMILY MEDICINE CLINIC | Age: 31
End: 2021-11-10

## 2021-11-10 DIAGNOSIS — E55.9 VITAMIN D DEFICIENCY: ICD-10-CM

## 2021-11-10 DIAGNOSIS — Z00.00 ROUTINE HEALTH MAINTENANCE: Primary | ICD-10-CM

## 2021-11-10 NOTE — TELEPHONE ENCOUNTER
LMTCB with patient's Mom Naomi to call back to confirm switching patient's appt on 3/26/20 to telephone visit..   Tsering Arizmendi [facility] Front Office Pool  Patient requesting labs orders for upcoming visit....   Please place labs    Future Appointments   Date Time Provider Department Center   2/1/2022 10:00 AM Osbaldo Rowland MD [facility] BS AMB

## 2021-11-10 NOTE — TELEPHONE ENCOUNTER
Geov[facility] Gale Front Office Pool  Patient requesting labs orders for upcoming visit. ...    Please place labs    Future Appointments   Date Time Provider Maegan Lissa   2/1/2022 10:00 AM MD ARIELLA Jackson BS AMB

## 2021-11-10 NOTE — TELEPHONE ENCOUNTER
Lab orders have been entered however she will also need a lab appointment prior to her physical exam appointment

## 2021-12-21 ENCOUNTER — OFFICE VISIT (OUTPATIENT)
Dept: FAMILY MEDICINE CLINIC | Age: 31
End: 2021-12-21
Payer: COMMERCIAL

## 2021-12-21 VITALS
DIASTOLIC BLOOD PRESSURE: 80 MMHG | OXYGEN SATURATION: 99 % | HEART RATE: 102 BPM | TEMPERATURE: 98.6 F | SYSTOLIC BLOOD PRESSURE: 132 MMHG | BODY MASS INDEX: 23.34 KG/M2 | WEIGHT: 136 LBS

## 2021-12-21 DIAGNOSIS — Z23 NEEDS FLU SHOT: ICD-10-CM

## 2021-12-21 DIAGNOSIS — L84 CORN OF TOE: ICD-10-CM

## 2021-12-21 DIAGNOSIS — M79.675 PAIN OF TOE OF LEFT FOOT: Primary | ICD-10-CM

## 2021-12-21 PROCEDURE — 99213 OFFICE O/P EST LOW 20 MIN: CPT | Performed by: FAMILY MEDICINE

## 2021-12-21 PROCEDURE — 90471 IMMUNIZATION ADMIN: CPT | Performed by: FAMILY MEDICINE

## 2021-12-21 PROCEDURE — 90686 IIV4 VACC NO PRSV 0.5 ML IM: CPT | Performed by: FAMILY MEDICINE

## 2021-12-21 NOTE — PROGRESS NOTES
HISTORY OF PRESENT ILLNESS  Grant Zayas is a 27 y.o. female. She reports pain in the left fifth toe for about 2 months sometimes associated with wearing tight shoes, sometimes when she bumps her foot. Mr#: [id number]      Past Medical History:   Diagnosis Date    ADHD (attention deficit hyperactivity disorder)     Bipolar disorder ([facility] 3/10/2010    Polyphagia(783.6) 3/10/2010       Past Surgical History:   Procedure Laterality Date    HX GYN      D&C       Family History   Problem Relation Age of Onset    Diabetes Maternal Grandfather     No Known Problems Mother     No Known Problems Sister     No Known Problems Brother        No Known Allergies    Social History     Tobacco Use   Smoking Status Former Smoker    Packs/day: 0.50    Types: Cigarettes   Smokeless Tobacco Never Used   Tobacco Comment    stopped smoking beginning of september 2019       Social History     Substance and Sexual Activity   Alcohol Use No    Comment: occassionally         Patient Active Problem List   Diagnosis Code    Bipolar disorder ([facility] F31.9    ADHD (attention deficit hyperactivity disorder) F90.9    Post term pregnancy O48.0         Current Outpatient Medications:     hydrOXYzine pamoate (VistariL) 25 mg capsule, Take 25 mg by mouth two (2) times daily as needed. , Disp: , Rfl:     dextroamphetamine-amphetamine (AdderalL) 5 mg tablet, Take 5 mg by mouth daily (after lunch). , Disp: , Rfl:     amphetamine-dextroamphetamine XR (Adderall XR) 30 mg XR capsule, Take 30 mg by mouth every morning. Per patient dosage changed from 25 mg to 30 mg by psychiatrist., Disp: , Rfl:     QUEtiapine (SEROqueL) 300 mg tablet, Take 300 mg by mouth nightly., Disp: , Rfl:     cholecalciferol (VITAMIN D3) (2,000 UNITS /50 MCG) cap capsule, Take  by mouth daily. , Disp: , Rfl:        Review of Systems   Musculoskeletal:        Left fifth toe pain     Visit Vitals  /80   Pulse (!) 102   Temp 98.6 °F (37 °C) (Temporal)   Wt 136 lb (61.7 kg)   LMP 12/01/2021   SpO2 99%   Breastfeeding No   BMI 23.34 kg/m²       Physical Exam  Vitals and nursing note reviewed. Constitutional:       Appearance: Normal appearance. She is well-developed. HENT:      Head: Normocephalic. Eyes:      Extraocular Movements: Extraocular movements intact. Cardiovascular:      Rate and Rhythm: Normal rate. Pulmonary:      Effort: Pulmonary effort is normal.   Musculoskeletal:      Cervical back: Neck supple. Skin:     General: Skin is warm and dry. Findings: Lesion ( Corn/callus lateral left fifth toe, no signs of infection) present. Neurological:      Mental Status: She is alert and oriented to person, place, and time. Psychiatric:         Mood and Affect: Mood normal.         Behavior: Behavior normal.         ASSESSMENT and PLAN    ICD-10-CM ICD-9-CM    1. Pain of toe of left foot  M79.675 729.5    2. Needs flu shot  Z23 V04.81 INFLUENZA VIRUS VAC QUAD,SPLIT,PRESV FREE SYRINGE IM   3. [place] of toe  L84 700    Assessment:  Corn/callus left fifth toe  Health maintenance recommendations:  Influenza immunization    Plan:  Recommend applying a corn pad available at the pharmacy, podiatry referral if not improved  Return for lab and physical as scheduled    Mando Kruger MD      PLEASE NOTE:   This document has been produced using voice recognition software. Unrecognized errors in transcription may be present.

## 2021-12-21 NOTE — PATIENT INSTRUCTIONS
Recommend applying a corn pad available at the pharmacy, podiatry referral if not improved  Return for lab and physical as scheduled

## 2021-12-21 NOTE — PROGRESS NOTES
Neela Ramey is a 27 y.o. female (: 1990) presenting to address:    Chief Complaint   Patient presents with    Toe Injury     pinky toe on left foot she noticed about 2 month ago after wearing tight shoes . currently has a scab . also hit the toe next to the pinky toe hit on the her vaccum  the other day and its purple and swollen     Immunization/Injection     patient wants flu shot . also doesnt recall getting a tdap in 2018 and would like another one . Pneumonia vaccine was givne last in  but patient would also like this vaccine as well. Vitals:    21 1121   BP: 132/80   Pulse: (!) 102   Temp: 98.6 °F (37 °C)   TempSrc: Temporal   SpO2: 99%   Weight: 136 lb (61.7 kg)   PainSc:  10 - Worst pain ever   PainLoc: Toe   LMP: 2021       Hearing/Vision:   No exam data present    Learning Assessment:     Learning Assessment 2021   PRIMARY LEARNER Patient   HIGHEST LEVEL OF EDUCATION - PRIMARY LEARNER  DID NOT GRADUATE HIGH SCHOOL   BARRIERS PRIMARY LEARNER NONE   CO-LEARNER CAREGIVER No   PRIMARY LANGUAGE ENGLISH    NEED No   LEARNER PREFERENCE PRIMARY DEMONSTRATION   ANSWERED BY patient   RELATIONSHIP SELF     Depression Screening:     3 most recent PHQ Screens 2021   Little interest or pleasure in doing things Not at all   Feeling down, depressed, irritable, or hopeless Not at all   Total Score PHQ 2 0     Fall Risk Assessment:     Fall Risk Assessment, last 12 mths 2021   Able to walk? Yes   Fall in past 12 months? 0   Do you feel unsteady? 0   Are you worried about falling 0     Abuse Screening:     Abuse Screening Questionnaire 2021   Do you ever feel afraid of your partner? N   Are you in a relationship with someone who physically or mentally threatens you? N   Is it safe for you to go home? Y     ADL Assessment:   No flowsheet data found. Coordination of Care Questionaire:   1. \"Have you been to the ER, urgent care clinic since your last visit? Hospitalized since your last visit? \" No    2. \"Have you seen or consulted any other health care providers outside of the [address] [place] [address] since your last visit? \" No     3. For patients aged 39-70: Has the patient had a colonoscopy? No     If the patient is female:    4. For patients aged 41-77: Has the patient had a mammogram within the past 2 years? No    5. For patients aged 21-65: Has the patient had a pap smear? Yes, but HM not satisfied. Rooming MA/LPN to request most recent results    Advanced Directive:   1. Do you have an Advanced Directive? NO    2. Would you like information on Advanced Directives? NO  Flu shot Immunization/s administered 12/21/2021 by Brent Ford LPN with guardian's consent. Patient tolerated procedure well. No reactions noted.

## 2022-01-25 ENCOUNTER — APPOINTMENT (OUTPATIENT)
Dept: FAMILY MEDICINE CLINIC | Age: 32
End: 2022-01-25

## 2022-01-25 DIAGNOSIS — Z00.00 ROUTINE HEALTH MAINTENANCE: ICD-10-CM

## 2022-01-25 DIAGNOSIS — E55.9 VITAMIN D DEFICIENCY: ICD-10-CM

## 2022-01-26 LAB
25(OH)D3+25(OH)D2 SERPL-MCNC: 30.4 NG/ML (ref 30–100)
ALBUMIN SERPL-MCNC: 4.2 G/DL (ref 3.8–4.8)
ALBUMIN/GLOB SERPL: 1.4 {RATIO} (ref 1.2–2.2)
ALP SERPL-CCNC: 48 IU/L (ref 44–121)
ALT SERPL-CCNC: 21 IU/L (ref 0–32)
APPEARANCE UR: ABNORMAL
AST SERPL-CCNC: 27 IU/L (ref 0–40)
BACTERIA #/AREA URNS HPF: ABNORMAL /[HPF]
BASOPHILS # BLD AUTO: 0.1 X10E3/UL (ref 0–0.2)
BASOPHILS NFR BLD AUTO: 2 %
BILIRUB SERPL-MCNC: 0.6 MG/DL (ref 0–1.2)
BILIRUB UR QL STRIP: NEGATIVE
BUN SERPL-MCNC: 10 MG/DL (ref 6–20)
BUN/CREAT SERPL: 14 (ref 9–23)
CALCIUM SERPL-MCNC: 9.3 MG/DL (ref 8.7–10.2)
CASTS URNS QL MICRO: ABNORMAL /LPF
CHLORIDE SERPL-SCNC: 104 MMOL/L (ref 96–106)
CHOLEST SERPL-MCNC: 190 MG/DL (ref 100–199)
CO2 SERPL-SCNC: 26 MMOL/L (ref 20–29)
COLOR UR: YELLOW
CREAT SERPL-MCNC: 0.74 MG/DL (ref 0.57–1)
EOSINOPHIL # BLD AUTO: 0.2 X10E3/UL (ref 0–0.4)
EOSINOPHIL NFR BLD AUTO: 4 %
EPI CELLS #/AREA URNS HPF: ABNORMAL /HPF (ref 0–10)
ERYTHROCYTE [DISTWIDTH] IN BLOOD BY AUTOMATED COUNT: 12.3 % (ref 11.7–15.4)
EST. AVERAGE GLUCOSE BLD GHB EST-MCNC: 103 MG/DL
GLOBULIN SER CALC-MCNC: 2.9 G/DL (ref 1.5–4.5)
GLUCOSE SERPL-MCNC: 89 MG/DL (ref 65–99)
GLUCOSE UR QL STRIP: NEGATIVE
HBA1C MFR BLD: 5.2 % (ref 4.8–5.6)
HCT VFR BLD AUTO: 40.7 % (ref 34–46.6)
HDLC SERPL-MCNC: 77 MG/DL
HGB BLD-MCNC: 13.4 G/DL (ref 11.1–15.9)
HGB UR QL STRIP: ABNORMAL
IMM GRANULOCYTES # BLD AUTO: 0 X10E3/UL (ref 0–0.1)
IMM GRANULOCYTES NFR BLD AUTO: 0 %
IMP & REVIEW OF LAB RESULTS: NORMAL
KETONES UR QL STRIP: ABNORMAL
LDLC SERPL CALC-MCNC: 102 MG/DL (ref 0–99)
LEUKOCYTE ESTERASE UR QL STRIP: ABNORMAL
LYMPHOCYTES # BLD AUTO: 1.8 X10E3/UL (ref 0.7–3.1)
LYMPHOCYTES NFR BLD AUTO: 40 %
MCH RBC QN AUTO: 28.9 PG (ref 26.6–33)
MCHC RBC AUTO-ENTMCNC: 32.9 G/DL (ref 31.5–35.7)
MCV RBC AUTO: 88 FL (ref 79–97)
MICRO URNS: ABNORMAL
MONOCYTES # BLD AUTO: 0.6 X10E3/UL (ref 0.1–0.9)
MONOCYTES NFR BLD AUTO: 13 %
NEUTROPHILS # BLD AUTO: 1.8 X10E3/UL (ref 1.4–7)
NEUTROPHILS NFR BLD AUTO: 41 %
NITRITE UR QL STRIP: NEGATIVE
PH UR STRIP: 6 [PH] (ref 5–7.5)
PLATELET # BLD AUTO: 352 X10E3/UL (ref 150–450)
POTASSIUM SERPL-SCNC: 4.4 MMOL/L (ref 3.5–5.2)
PROT SERPL-MCNC: 7.1 G/DL (ref 6–8.5)
PROT UR QL STRIP: ABNORMAL
RBC # BLD AUTO: 4.64 X10E6/UL (ref 3.77–5.28)
RBC #/AREA URNS HPF: >30 /HPF (ref 0–2)
SODIUM SERPL-SCNC: 140 MMOL/L (ref 134–144)
SP GR UR STRIP: 1.03 (ref 1–1.03)
TRIGL SERPL-MCNC: 60 MG/DL (ref 0–149)
TSH SERPL-ACNC: 1.15 UIU/ML (ref 0.45–4.5)
UROBILINOGEN UR STRIP-MCNC: 1 MG/DL (ref 0.2–1)
VLDLC SERPL CALC-MCNC: 11 MG/DL (ref 5–40)
WBC # BLD AUTO: 4.3 X10E3/UL (ref 3.4–10.8)
WBC #/AREA URNS HPF: ABNORMAL /HPF (ref 0–5)

## 2022-01-27 ENCOUNTER — TELEPHONE (OUTPATIENT)
Dept: FAMILY MEDICINE CLINIC | Age: 32
End: 2022-01-27

## 2022-01-27 NOTE — TELEPHONE ENCOUNTER
Gynecologist stated that they sent latest cervical exam and procedures [facility]   [facility] wants to know if we have them and to update chart     She also wants us to send her psycharitiast her latest lab results   [address] [place] [address]

## 2022-01-28 ENCOUNTER — TELEPHONE (OUTPATIENT)
Dept: FAMILY MEDICINE CLINIC | Age: 32
End: 2022-01-28

## 2022-01-28 NOTE — TELEPHONE ENCOUNTER
Left message . I do not recall seeing anything recently does she recall the date of the last exam. I also spoke with [facility] and have faxed a copy of patients last labs .

## 2022-01-28 NOTE — TELEPHONE ENCOUNTER
Pt ws returning phone call she stated that the GYN office stated that they faxed over the information on 1/20/22.

## 2022-02-06 NOTE — PROGRESS NOTES
HISTORY OF PRESENT ILLNESS  Abdias Santoyo is a 27 y.o. female. She presents for health assessment, preventative care and follow-up with a history of vitamin D deficiency, high-grade cervical dysplasia followed by gynecology and scheduled for LEEP procedure and bipolar disorder followed by psychiatry. Today she reports that she is feeling quite well. She notes that she has a LEEP procedure scheduled on 3/7/2022. She has a court date with regard to custody of her child on 3/16/2022. She has been working on her GED indicates that she is getting close to completing that. Mr#: [id number]      Past Medical History:   Diagnosis Date    ADHD (attention deficit hyperactivity disorder)     Bipolar disorder ([facility] 3/10/2010    Polyphagia(783.6) 3/10/2010       Past Surgical History:   Procedure Laterality Date    HX GYN      D&C       Family History   Problem Relation Age of Onset    Diabetes Maternal Grandfather     No Known Problems Mother     No Known Problems Sister     No Known Problems Brother        No Known Allergies    Social History     Tobacco Use   Smoking Status Former Smoker    Packs/day: 0.50    Types: Cigarettes   Smokeless Tobacco Never Used   Tobacco Comment    stopped smoking beginning of september 2019       Social History     Substance and Sexual Activity   Alcohol Use No    Comment: occassionally         Patient Active Problem List   Diagnosis Code    Bipolar disorder ([facility] F31.9    ADHD (attention deficit hyperactivity disorder) F90.9    Post term pregnancy O48.0         Current Outpatient Medications:     hydrOXYzine pamoate (VistariL) 25 mg capsule, Take 25 mg by mouth two (2) times daily as needed. , Disp: , Rfl:     dextroamphetamine-amphetamine (AdderalL) 5 mg tablet, Take 5 mg by mouth daily (after lunch). , Disp: , Rfl:     amphetamine-dextroamphetamine XR (Adderall XR) 30 mg XR capsule, Take 30 mg by mouth every morning.  Per patient dosage changed from 25 mg to 30 mg by psychiatrist., Disp: , Rfl:     QUEtiapine (SEROqueL) 300 mg tablet, Take 300 mg by mouth nightly., Disp: , Rfl:     cholecalciferol (VITAMIN D3) (2,000 UNITS /50 MCG) cap capsule, Take  by mouth daily. , Disp: , Rfl:        Review of Systems   Constitutional: Negative for chills, fever and weight loss. HENT: Negative for congestion, ear pain, hearing loss and sore throat. Eyes: Negative for blurred vision and double vision. Respiratory: Negative for cough, shortness of breath and wheezing. Cardiovascular: Negative for chest pain, palpitations and leg swelling. Gastrointestinal: Negative for abdominal pain, blood in stool, constipation, diarrhea, heartburn, melena, nausea and vomiting. Genitourinary: Negative for dysuria and urgency. Musculoskeletal: Negative for joint pain and myalgias. Skin: Negative for itching and rash. Neurological: Negative for dizziness, tingling, sensory change, focal weakness and headaches. Endo/Heme/Allergies: Negative for environmental allergies. Psychiatric/Behavioral: Negative for depression. The patient is not nervous/anxious and does not have insomnia. Visit Vitals  /80   Pulse 89   Temp 97.9 °F (36.6 °C)   Resp 16   Ht 5' 4\" (1.626 m)   Wt 135 lb (61.2 kg)   LMP 01/31/2022   SpO2 99%   Breastfeeding No   BMI 23.17 kg/m²       Physical Exam  Vitals and nursing note reviewed. Constitutional:       General: She is not in acute distress. Appearance: Normal appearance. She is normal weight. She is not ill-appearing. HENT:      Head: Normocephalic. Right Ear: Tympanic membrane, ear canal and external ear normal.      Left Ear: Tympanic membrane, ear canal and external ear normal.   Eyes:      Extraocular Movements: Extraocular movements intact. Conjunctiva/sclera: Conjunctivae normal.      Pupils: Pupils are equal, round, and reactive to light. Neck:      Vascular: No carotid bruit.    Cardiovascular:      Rate and Rhythm: Normal rate and regular rhythm. Heart sounds: Normal heart sounds. Pulmonary:      Effort: Pulmonary effort is normal.      Breath sounds: Normal breath sounds. Abdominal:      Palpations: Abdomen is soft. Tenderness: There is no abdominal tenderness. Musculoskeletal:         General: No deformity. Cervical back: Neck supple. Right lower leg: No edema. Left lower leg: No edema. Skin:     General: Skin is warm and dry. Neurological:      Mental Status: She is alert and oriented to person, place, and time. Psychiatric:         Mood and Affect: Mood normal.         Behavior: Behavior normal.       Results for Natividad Arzola (MRN [id number]) as of 2/6/2022 15:11   Ref. Range 1/28/2021 11:36 4/2/2021 00:00 4/5/2021 09:21 1/25/2022 10:00   WBC Latest Ref Range: 3.4 - 10.8 x10E3/uL 4.7   4.3   RBC Latest Ref Range: 3.77 - 5.28 x10E6/uL 4.56   4.64   HGB Latest Ref Range: 11.1 - 15.9 g/dL 13.3   13.4   HCT Latest Ref Range: 34.0 - 46.6 % 40.6   40.7   MCV Latest Ref Range: 79 - 97 fL 89   88   MCH Latest Ref Range: 26.6 - 33.0 pg 29.2   28.9   MCHC Latest Ref Range: 31.5 - 35.7 g/dL 32.8   32.9   RDW Latest Ref Range: 11.7 - 15.4 % 12.2   12.3   PLATELET Latest Ref Range: 150 - 450 x10E3/uL 338   352   NEUTROPHILS Latest Ref Range: Not Estab. % 43   41   Lymphocytes Latest Ref Range: Not Estab. % 44   40   MONOCYTES Latest Ref Range: Not Estab. % 11   13   EOSINOPHILS Latest Ref Range: Not Estab. % 1   4   BASOPHILS Latest Ref Range: Not Estab. % 1   2   IMMATURE GRANULOCYTES Latest Ref Range: Not Estab. % 0   0   ABS. NEUTROPHILS Latest Ref Range: 1.4 - 7.0 x10E3/uL 2.0   1.8   ABS. IMM. GRANS. Latest Ref Range: 0.0 - 0.1 x10E3/uL 0.0   0.0   Abs Lymphocytes Latest Ref Range: 0.7 - 3.1 x10E3/uL 2.0   1.8   ABS. MONOCYTES Latest Ref Range: 0.1 - 0.9 x10E3/uL 0.5   0.6   ABS. EOSINOPHILS Latest Ref Range: 0.0 - 0.4 x10E3/uL 0.1   0.2   ABS.  BASOPHILS Latest Ref Range: 0.0 - 0.2 x10E3/uL 0.0   0.1 Color Latest Ref Range: Yellow  Yellow   Yellow   Appearance Latest Ref Range: Clear  Clear   Cloudy (A)   Specific Gravity Latest Ref Range: 1.005 - 1.030  1.019   1.027   pH (UA) Latest Ref Range: 5.0 - 7.5  8.5 (H)   6.0   Protein Latest Ref Range: Negative/Trace  Negative   1+ (A)   Glucose Latest Ref Range: Negative  Negative   Negative   Ketone Latest Ref Range: Negative  Negative   Trace (A)   Blood Latest Ref Range: Negative  Negative   3+ (A)   Bilirubin Latest Ref Range: Negative  Negative   Negative   Urobilinogen Latest Ref Range: 0.2 - 1.0 mg/dL 0.2   1.0   Nitrites Latest Ref Range: Negative  Negative   Negative   Leukocyte Esterase Latest Ref Range: Negative  Negative   1+ (A)   MICROSCOPIC EXAMINATION Unknown    Rpt (A)   Microscopic Examination Unknown Comment   See additional order   Epithelial cells Latest Ref Range: 0 - 10 /hpf    0-10   WBC Latest Ref Range: 0 - 5 /hpf    6-10 (A)   RBC Latest Ref Range: 0 - 2 /hpf    >30 (A)   Bacteria Latest Ref Range: None seen/Few     None seen   Sodium Latest Ref Range: 134 - 144 mmol/L 139   140   Potassium Latest Ref Range: 3.5 - 5.2 mmol/L 4.3   4.4   Chloride Latest Ref Range: 96 - 106 mmol/L 103   104   CO2 Latest Ref Range: 20 - 29 mmol/L 25   26   Glucose Latest Ref Range: 65 - 99 mg/dL 80   89   BUN Latest Ref Range: 6 - 20 mg/dL 8   10   Creatinine Latest Ref Range: 0.57 - 1.00 mg/dL 0.60   0.74   BUN/Creatinine ratio Latest Ref Range: 9 - 23  13   14   Calcium Latest Ref Range: 8.7 - 10.2 mg/dL 9.4   9.3   GFR est non-AA Latest Ref Range: >59 mL/min/1.73 122   108   GFR est AA Latest Ref Range: >59 mL/min/1.73 140   124   Bilirubin, total Latest Ref Range: 0.0 - 1.2 mg/dL 0.4   0.6   Protein, total Latest Ref Range: 6.0 - 8.5 g/dL 7.1   7.1   Albumin Latest Ref Range: 3.8 - 4.8 g/dL 4.4   4.2   A-G Ratio Latest Ref Range: 1.2 - 2.2  1.6   1.4   ALT Latest Ref Range: 0 - 32 IU/L 19   21   AST Latest Ref Range: 0 - 40 IU/L 20   27   Alk. phosphatase Latest Ref Range: 44 - 121 IU/L 45   48   Triglyceride Latest Ref Range: 0 - 149 mg/dL 61   60   Cholesterol, total Latest Ref Range: 100 - 199 mg/dL 162   190   HDL Cholesterol Latest Ref Range: >39 mg/dL 80   77   VLDL, calculated Latest Ref Range: 5 - 40 mg/dL 12   11   LDL, calculated Latest Ref Range: 0 - 99 mg/dL 70   102 (H)   Hemoglobin A1c, (calculated) Latest Ref Range: 4.8 - 5.6 % 5.1   5.2   Estimated average glucose Latest Units: mg/dL 100   103   TSH Latest Ref Range: 0.450 - 4.500 uIU/mL 1.860   1.150   PATHOLOGY REPORT Unknown  Attch     HEPATITIS C AB Unknown   Rpt    VITAMIN D, 25-HYDROXY Latest Ref Range: 30.0 - 100.0 ng/mL 31.0   30.4   Hematuria secondary to menses  ASSESSMENT and PLAN    ICD-10-CM ICD-9-CM    1. Routine general medical examination at a health care facility  Z00.00 V70.0    2. Vitamin D deficiency  E55.9 268.9    3. High grade squamous intraepithelial cervical dysplasia  R87.613 795.04    4. Bipolar affective disorder, remission status unspecified ([facility]  F31.9 296.80    Assessment:  Vitamin D level at the lower limits of normal with current treatment  High-grade cervical dysplasia with disposition scheduled  Bipolar disorder status stable, followed by psychiatry    Plan:  Continue current medications  Gynecology and psychiatry follow-ups as planned  Return for annual physical exam and follow-up in 1 year or sooner with any problems    Neno Francis MD      PLEASE NOTE:   This document has been produced using voice recognition software. Unrecognized errors in transcription may be present.

## 2022-02-07 ENCOUNTER — OFFICE VISIT (OUTPATIENT)
Dept: FAMILY MEDICINE CLINIC | Age: 32
End: 2022-02-07
Payer: COMMERCIAL

## 2022-02-07 VITALS
TEMPERATURE: 97.9 F | OXYGEN SATURATION: 99 % | WEIGHT: 135 LBS | BODY MASS INDEX: 23.05 KG/M2 | SYSTOLIC BLOOD PRESSURE: 120 MMHG | DIASTOLIC BLOOD PRESSURE: 80 MMHG | RESPIRATION RATE: 16 BRPM | HEIGHT: 64 IN | HEART RATE: 89 BPM

## 2022-02-07 DIAGNOSIS — E55.9 VITAMIN D DEFICIENCY: ICD-10-CM

## 2022-02-07 DIAGNOSIS — F31.9 BIPOLAR AFFECTIVE DISORDER, REMISSION STATUS UNSPECIFIED (HCC): ICD-10-CM

## 2022-02-07 DIAGNOSIS — Z00.00 ROUTINE GENERAL MEDICAL EXAMINATION AT A HEALTH CARE FACILITY: Primary | ICD-10-CM

## 2022-02-07 DIAGNOSIS — R87.613 HIGH GRADE SQUAMOUS INTRAEPITHELIAL CERVICAL DYSPLASIA: ICD-10-CM

## 2022-02-07 PROCEDURE — 99395 PREV VISIT EST AGE 18-39: CPT | Performed by: FAMILY MEDICINE

## 2022-02-07 NOTE — TELEPHONE ENCOUNTER
I do not see pap results in chart yet . I called Dr Savita Patel office to request another copy which has been received and placed in box for review and scanning .

## 2022-02-07 NOTE — PROGRESS NOTES
Lyle Cornelius is a 27 y.o. female (: 1990) presenting to address:    Chief Complaint   Patient presents with    Complete Physical       Vitals:    22 1037   BP: 120/80   Pulse: 89   Resp: 16   Temp: 97.9 °F (36.6 °C)   SpO2: 99%   Weight: 135 lb (61.2 kg)   Height: 5' 4\" (1.626 m)   PainSc:   0 - No pain   LMP: 2022       Hearing/Vision:   No exam data present    Learning Assessment:     Learning Assessment 2021   PRIMARY LEARNER Patient   HIGHEST LEVEL OF EDUCATION - PRIMARY LEARNER  DID NOT GRADUATE HIGH SCHOOL   BARRIERS PRIMARY LEARNER NONE   CO-LEARNER CAREGIVER No   PRIMARY LANGUAGE ENGLISH    NEED No   LEARNER PREFERENCE PRIMARY DEMONSTRATION   ANSWERED BY patient   RELATIONSHIP SELF     Depression Screening:     3 most recent PHQ Screens 2022   Little interest or pleasure in doing things Not at all   Feeling down, depressed, irritable, or hopeless Not at all   Total Score PHQ 2 0   Trouble falling or staying asleep, or sleeping too much Not at all   Feeling tired or having little energy Not at all   Poor appetite, weight loss, or overeating Not at all   Feeling bad about yourself - or that you are a failure or have let yourself or your family down Not at all   Trouble concentrating on things such as school, work, reading, or watching TV Not at all   Moving or speaking so slowly that other people could have noticed; or the opposite being so fidgety that others notice Not at all   Thoughts of being better off dead, or hurting yourself in some way Not at all   PHQ 9 Score 0   How difficult have these problems made it for you to do your work, take care of your home and get along with others -     Fall Risk Assessment:     Fall Risk Assessment, last 12 mths 2021   Able to walk? Yes   Fall in past 12 months? 0   Do you feel unsteady?  0   Are you worried about falling 0     Abuse Screening:     Abuse Screening Questionnaire 2021   Do you ever feel afraid of your partner? N   Are you in a relationship with someone who physically or mentally threatens you? N   Is it safe for you to go home? Y     ADL Assessment:   No flowsheet data found. Coordination of Care Questionaire:   1. \"Have you been to the ER, urgent care clinic since your last visit? Hospitalized since your last visit? \" No    2. \"Have you seen or consulted any other health care providers outside of the [address] [place] [address] since your last visit? \" No     3. For patients aged 39-70: Has the patient had a colonoscopy? NA - based on age     If the patient is female:    4. For patients aged 41-77: Has the patient had a mammogram within the past 2 years? NA - based on age    11. For patients aged 21-65: Has the patient had a pap smear? Yes - no Care Gap present    Advanced Directive:   1. Do you have an Advanced Directive? NO    2. Would you like information on Advanced Directives?  NO

## 2022-02-07 NOTE — PATIENT INSTRUCTIONS
Continue current medications  Gynecology and psychiatry follow-ups as planned  Return for annual physical exam and follow-up in 1 year or sooner with any problems

## 2022-03-19 PROBLEM — O48.0 POST TERM PREGNANCY: Status: ACTIVE | Noted: 2018-05-08

## 2022-04-11 ENCOUNTER — TELEPHONE (OUTPATIENT)
Dept: FAMILY MEDICINE CLINIC | Age: 32
End: 2022-04-11

## 2022-04-11 RX ORDER — CALCIUM CARBONATE 300MG(750)
2000 TABLET,CHEWABLE ORAL DAILY
COMMUNITY

## 2022-06-29 ENCOUNTER — TELEPHONE (OUTPATIENT)
Dept: FAMILY MEDICINE CLINIC | Age: 32
End: 2022-06-29

## 2022-06-29 NOTE — TELEPHONE ENCOUNTER
Pt wanted to let us know that she signed a release for her psychiatrist to release her most recent labs to us and she is requesting a call to let her know we received them.

## 2022-08-26 ENCOUNTER — TELEPHONE (OUTPATIENT)
Dept: FAMILY MEDICINE CLINIC | Age: 32
End: 2022-08-26

## 2022-08-26 NOTE — TELEPHONE ENCOUNTER
Patient called wanting add care team members  Dentist  Talia Rodriguez with [facility]   Dermatologist jose maria oh [facility]   Would like a returned call from nurse. Contact [phone]  No future appointments.

## 2022-09-07 ENCOUNTER — TELEPHONE (OUTPATIENT)
Dept: FAMILY MEDICINE CLINIC | Age: 32
End: 2022-09-07

## 2022-09-07 NOTE — TELEPHONE ENCOUNTER
Pt called in regards to abnormal lab results. Has questions about liver & Kidney function. No future appointments.

## 2022-09-07 NOTE — TELEPHONE ENCOUNTER
I called and spoke with patient she will try and upload the results to my chart for Dr Faiza Rivera to review and I will call and let her know what needs to be done if anything .

## 2022-09-07 NOTE — TELEPHONE ENCOUNTER
Pt called wanting to speak to the nurse, She saw her psychiatrist this morning and was told by them to follow up with her PCP in regards to her abnormal labs. They think she just needs more bw done.  I offered to schedule her an appt but she declined wanting to speak to the nurse first.

## 2022-09-07 NOTE — TELEPHONE ENCOUNTER
Pt called back wanting to speak with the nurse. I did inform patient that the nurse will call her back when she available.

## 2022-09-08 NOTE — TELEPHONE ENCOUNTER
Pt is calling back to make sure that she receives a phone call in regards to her blood work.  Please advise

## 2022-09-08 NOTE — TELEPHONE ENCOUNTER
I called and left detailed message also told her Dr Chad Reed replied to her my chart with the same message .

## 2022-09-23 ENCOUNTER — TELEPHONE (OUTPATIENT)
Dept: FAMILY MEDICINE CLINIC | Age: 32
End: 2022-09-23

## 2022-09-23 DIAGNOSIS — E55.9 VITAMIN D DEFICIENCY: ICD-10-CM

## 2022-09-23 DIAGNOSIS — Z00.00 ROUTINE GENERAL MEDICAL EXAMINATION AT A HEALTH CARE FACILITY: Primary | ICD-10-CM

## 2022-09-23 NOTE — TELEPHONE ENCOUNTER
----- Message from April Geronimo sent at 9/23/2022 11:08 AM EDT -----  Subject: Referral Request    Reason for referral request? Patient scheduled her annual physical with   Dr. Shawna Box on 2/8 and would like her annual labs ordered for her. Please   call patient back to advise when ordered, thank you. Provider patient wants to be referred to(if known):     Provider Phone Number(if known):     Additional Information for Provider?   ---------------------------------------------------------------------------  --------------  [id number]    [phone]; OK to leave message on voicemail  ---------------------------------------------------------------------------  --------------

## 2022-12-29 ENCOUNTER — TELEPHONE (OUTPATIENT)
Dept: FAMILY MEDICINE CLINIC | Age: 32
End: 2022-12-29

## 2022-12-29 NOTE — TELEPHONE ENCOUNTER
Pt is asking for a return call from the nurse in regards to the most recent [employer] message. She wanted to know if something would be okay but did not specify what that something was. Please return her call at the earliest convenience.

## 2023-02-01 ENCOUNTER — APPOINTMENT (OUTPATIENT)
Dept: FAMILY MEDICINE CLINIC | Age: 33
End: 2023-02-01

## 2023-02-01 DIAGNOSIS — E55.9 VITAMIN D DEFICIENCY: ICD-10-CM

## 2023-02-01 DIAGNOSIS — Z00.00 ROUTINE GENERAL MEDICAL EXAMINATION AT A HEALTH CARE FACILITY: ICD-10-CM

## 2023-02-02 LAB
25(OH)D3+25(OH)D2 SERPL-MCNC: 46.7 NG/ML (ref 30–100)
ALBUMIN SERPL-MCNC: 4.3 G/DL (ref 3.8–4.8)
ALBUMIN/GLOB SERPL: 1.5 {RATIO} (ref 1.2–2.2)
ALP SERPL-CCNC: 51 IU/L (ref 44–121)
ALT SERPL-CCNC: 17 IU/L (ref 0–32)
APPEARANCE UR: CLEAR
AST SERPL-CCNC: 24 IU/L (ref 0–40)
BASOPHILS # BLD AUTO: 0.1 X10E3/UL (ref 0–0.2)
BASOPHILS NFR BLD AUTO: 1 %
BILIRUB SERPL-MCNC: 0.5 MG/DL (ref 0–1.2)
BILIRUB UR QL STRIP: NEGATIVE
BUN SERPL-MCNC: 13 MG/DL (ref 6–20)
BUN/CREAT SERPL: 17 (ref 9–23)
CALCIUM SERPL-MCNC: 9.2 MG/DL (ref 8.7–10.2)
CHLORIDE SERPL-SCNC: 102 MMOL/L (ref 96–106)
CHOLEST SERPL-MCNC: 194 MG/DL (ref 100–199)
CO2 SERPL-SCNC: 21 MMOL/L (ref 20–29)
COLOR UR: YELLOW
CREAT SERPL-MCNC: 0.75 MG/DL (ref 0.57–1)
EGFRCR SERPLBLD CKD-EPI 2021: 108 ML/MIN/1.73
EOSINOPHIL # BLD AUTO: 0.1 X10E3/UL (ref 0–0.4)
EOSINOPHIL NFR BLD AUTO: 2 %
ERYTHROCYTE [DISTWIDTH] IN BLOOD BY AUTOMATED COUNT: 12.4 % (ref 11.7–15.4)
EST. AVERAGE GLUCOSE BLD GHB EST-MCNC: 111 MG/DL
GLOBULIN SER CALC-MCNC: 2.9 G/DL (ref 1.5–4.5)
GLUCOSE SERPL-MCNC: 87 MG/DL (ref 70–99)
GLUCOSE UR QL STRIP: NEGATIVE
HBA1C MFR BLD: 5.5 % (ref 4.8–5.6)
HCT VFR BLD AUTO: 38.8 % (ref 34–46.6)
HDLC SERPL-MCNC: 79 MG/DL
HGB BLD-MCNC: 13.4 G/DL (ref 11.1–15.9)
HGB UR QL STRIP: NEGATIVE
IMM GRANULOCYTES # BLD AUTO: 0 X10E3/UL (ref 0–0.1)
IMM GRANULOCYTES NFR BLD AUTO: 0 %
IMP & REVIEW OF LAB RESULTS: NORMAL
KETONES UR QL STRIP: NEGATIVE
LDLC SERPL CALC-MCNC: 105 MG/DL (ref 0–99)
LEUKOCYTE ESTERASE UR QL STRIP: NEGATIVE
LYMPHOCYTES # BLD AUTO: 2.2 X10E3/UL (ref 0.7–3.1)
LYMPHOCYTES NFR BLD AUTO: 44 %
MCH RBC QN AUTO: 29.8 PG (ref 26.6–33)
MCHC RBC AUTO-ENTMCNC: 34.5 G/DL (ref 31.5–35.7)
MCV RBC AUTO: 86 FL (ref 79–97)
MICRO URNS: NORMAL
MONOCYTES # BLD AUTO: 0.6 X10E3/UL (ref 0.1–0.9)
MONOCYTES NFR BLD AUTO: 11 %
NEUTROPHILS # BLD AUTO: 2.1 X10E3/UL (ref 1.4–7)
NEUTROPHILS NFR BLD AUTO: 42 %
NITRITE UR QL STRIP: NEGATIVE
PH UR STRIP: 6 [PH] (ref 5–7.5)
PLATELET # BLD AUTO: 336 X10E3/UL (ref 150–450)
POTASSIUM SERPL-SCNC: 3.8 MMOL/L (ref 3.5–5.2)
PROT SERPL-MCNC: 7.2 G/DL (ref 6–8.5)
PROT UR QL STRIP: NEGATIVE
RBC # BLD AUTO: 4.5 X10E6/UL (ref 3.77–5.28)
SODIUM SERPL-SCNC: 139 MMOL/L (ref 134–144)
SP GR UR STRIP: 1.02 (ref 1–1.03)
TRIGL SERPL-MCNC: 52 MG/DL (ref 0–149)
TSH SERPL DL<=0.005 MIU/L-ACNC: 2.06 UIU/ML (ref 0.45–4.5)
UROBILINOGEN UR STRIP-MCNC: 0.2 MG/DL (ref 0.2–1)
VLDLC SERPL CALC-MCNC: 10 MG/DL (ref 5–40)
WBC # BLD AUTO: 4.9 X10E3/UL (ref 3.4–10.8)

## 2023-02-07 NOTE — PROGRESS NOTES
HISTORY OF PRESENT ILLNESS  Rosaura Sanchez is a 34 y.o. female. She presents for health assessment, preventative care and follow-up with a history of vitamin D deficiency and bipolar disorder followed by psychiatry. Mr#: [id number]      Past Medical History:   Diagnosis Date    ADHD (attention deficit hyperactivity disorder)     Bipolar disorder ([facility] 3/10/2010    Polyphagia(783.6) 3/10/2010       Past Surgical History:   Procedure Laterality Date    HX GYN      D&C    HX LEEP PROCEDURE  03/04/2022    Pathology with high-grade dysplasia EVIN III       Family History   Problem Relation Age of Onset    Diabetes Maternal Grandfather     No Known Problems Mother     No Known Problems Sister     No Known Problems Brother        No Known Allergies    Social History     Tobacco Use   Smoking Status Former    Packs/day: 0.50    Types: Cigarettes   Smokeless Tobacco Never   Tobacco Comments    stopped smoking beginning of september 2019       Social History     Substance and Sexual Activity   Alcohol Use No    Comment: occassionally         Patient Active Problem List   Diagnosis Code    Bipolar disorder ([facility] F31.9    ADHD (attention deficit hyperactivity disorder) F90.9    Post term pregnancy O48.0    Vitamin D deficiency E55.9         Current Outpatient Medications:     Cholecalciferol, Vitamin D3, 25 mcg (1,000 unit) chew, Take 2,000 Units by mouth daily. , Disp: , Rfl:     hydrOXYzine pamoate (VISTARIL) 25 mg capsule, Take 25 mg by mouth two (2) times daily as needed. , Disp: , Rfl:     dextroamphetamine-amphetamine (ADDERALL) 5 mg tablet, Take 5 mg by mouth daily (after lunch). , Disp: , Rfl:     amphetamine-dextroamphetamine XR (ADDERALL XR) 30 mg XR capsule, Take 30 mg by mouth every morning.  Per patient dosage changed from 25 mg to 30 mg by psychiatrist., Disp: , Rfl:     QUEtiapine (SEROquel) 300 mg tablet, Take 300 mg by mouth nightly., Disp: , Rfl:     cholecalciferol (VITAMIN D3) (2,000 UNITS /50 MCG) cap capsule, Take  by mouth daily. , Disp: , Rfl:        Review of Systems   Constitutional:  Negative for chills, fever and weight loss. HENT:  Negative for congestion, ear pain, hearing loss and sore throat. Eyes:  Negative for blurred vision and double vision. Respiratory:  Negative for cough, shortness of breath and wheezing. Cardiovascular:  Negative for chest pain, palpitations and leg swelling. Gastrointestinal:  Negative for abdominal pain, blood in stool, constipation, diarrhea, heartburn, melena, nausea and vomiting. Genitourinary:  Negative for dysuria and urgency. Musculoskeletal:  Negative for joint pain and myalgias. Skin:  Negative for itching and rash. Neurological:  Negative for dizziness, tingling, sensory change, focal weakness and headaches. Endo/Heme/Allergies:  Negative for environmental allergies. Psychiatric/Behavioral:  Negative for depression. The patient is not nervous/anxious and does not have insomnia. Visit Vitals  /80   Pulse 100   Temp 98.7 °F (37.1 °C) (Temporal)   Resp 16   Ht 5' 4\" (1.626 m)   Wt 139 lb (63 kg)   LMP 01/08/2023   SpO2 98%   BMI 23.86 kg/m²       Physical Exam  Vitals and nursing note reviewed. Constitutional:       General: She is not in acute distress. Appearance: Normal appearance. She is not ill-appearing. HENT:      Head: Normocephalic. Right Ear: Tympanic membrane, ear canal and external ear normal.      Left Ear: Tympanic membrane, ear canal and external ear normal.      Mouth/Throat:      Mouth: Mucous membranes are moist.      Pharynx: Oropharynx is clear. Eyes:      Extraocular Movements: Extraocular movements intact. Conjunctiva/sclera: Conjunctivae normal.      Pupils: Pupils are equal, round, and reactive to light. Neck:      Vascular: No carotid bruit. Cardiovascular:      Rate and Rhythm: Normal rate and regular rhythm. Heart sounds: Normal heart sounds.    Pulmonary:      Effort: Pulmonary effort is normal.      Breath sounds: Normal breath sounds. Abdominal:      Palpations: Abdomen is soft. Tenderness: There is no abdominal tenderness. Musculoskeletal:         General: No deformity. Cervical back: Neck supple. Right lower leg: No edema. Left lower leg: No edema. Skin:     General: Skin is warm and dry. Neurological:      Mental Status: She is alert and oriented to person, place, and time. Psychiatric:         Mood and Affect: Mood normal.         Behavior: Behavior normal.             ASSESSMENT and PLAN    ICD-10-CM ICD-9-CM    1. Routine general medical examination at a health care facility  Z00.00 V70.0       2. Vitamin D deficiency  E55.9 268.9       3. Bipolar affective disorder, remission status unspecified ([facility]  F31.9 296.80       4. Needs flu shot  Z23 V04.81 INFLUENZA, FLUARIX, FLULAVAL, FLUZONE (AGE 6 MO+), AFLURIA(AGE 3Y+) IM, PF, 0.5 ML      Current status:  Satisfactory general health evaluation  Satisfactory vitamin D level  Bipolar disorder followed by psychiatry with symptoms reported as stable    Health maintenance recommendations:  COVID-19 immunization with bivalent vaccine  Influenza immunization-today  Pap smear current    Plan:  Continue current medications  Continue with psychiatry follow-up  Return for annual physical exam and follow-up in 1 year or sooner with any problems  Please always arrive at least 15 minutes before your scheduled appointment time  Yvette Martinez MD      PLEASE NOTE:   This document has been produced using voice recognition software. Unrecognized errors in transcription may be present.

## 2023-02-08 ENCOUNTER — OFFICE VISIT (OUTPATIENT)
Dept: FAMILY MEDICINE CLINIC | Age: 33
End: 2023-02-08
Payer: COMMERCIAL

## 2023-02-08 VITALS
SYSTOLIC BLOOD PRESSURE: 120 MMHG | WEIGHT: 139 LBS | DIASTOLIC BLOOD PRESSURE: 80 MMHG | OXYGEN SATURATION: 98 % | HEART RATE: 100 BPM | HEIGHT: 64 IN | RESPIRATION RATE: 16 BRPM | TEMPERATURE: 98.7 F | BODY MASS INDEX: 23.73 KG/M2

## 2023-02-08 DIAGNOSIS — Z00.00 ROUTINE GENERAL MEDICAL EXAMINATION AT A HEALTH CARE FACILITY: Primary | ICD-10-CM

## 2023-02-08 DIAGNOSIS — Z23 NEEDS FLU SHOT: ICD-10-CM

## 2023-02-08 DIAGNOSIS — E55.9 VITAMIN D DEFICIENCY: ICD-10-CM

## 2023-02-08 DIAGNOSIS — F31.9 BIPOLAR AFFECTIVE DISORDER, REMISSION STATUS UNSPECIFIED (HCC): ICD-10-CM

## 2023-02-08 PROCEDURE — 99395 PREV VISIT EST AGE 18-39: CPT | Performed by: FAMILY MEDICINE

## 2023-02-08 PROCEDURE — 90686 IIV4 VACC NO PRSV 0.5 ML IM: CPT | Performed by: FAMILY MEDICINE

## 2023-02-08 NOTE — PROGRESS NOTES
Javed Lo is a 34 y.o. female (: 1990) presenting to address:    Chief Complaint   Patient presents with    Complete Physical    Immunization/Injection     Would like flu shot . Vitals:    23 1005   BP: 120/80   Pulse: 100   Resp: 16   Temp: 98.7 °F (37.1 °C)   TempSrc: Temporal   SpO2: 98%   Weight: 129 lb (58.5 kg)   Height: 5' 4\" (1.626 m)   PainSc:   0 - No pain   LMP: 2023       Hearing/Vision:   No results found. Learning Assessment:     Learning Assessment 2021   PRIMARY LEARNER Patient   HIGHEST LEVEL OF EDUCATION - PRIMARY LEARNER  DID NOT GRADUATE HIGH SCHOOL   BARRIERS PRIMARY LEARNER NONE   CO-LEARNER CAREGIVER No   PRIMARY LANGUAGE ENGLISH    NEED No   LEARNER PREFERENCE PRIMARY DEMONSTRATION   ANSWERED BY patient   RELATIONSHIP SELF     Depression Screening:     3 most recent PHQ Screens 2022   Little interest or pleasure in doing things Not at all   Feeling down, depressed, irritable, or hopeless Not at all   Total Score PHQ 2 0   Trouble falling or staying asleep, or sleeping too much Not at all   Feeling tired or having little energy Not at all   Poor appetite, weight loss, or overeating Not at all   Feeling bad about yourself - or that you are a failure or have let yourself or your family down Not at all   Trouble concentrating on things such as school, work, reading, or watching TV Not at all   Moving or speaking so slowly that other people could have noticed; or the opposite being so fidgety that others notice Not at all   Thoughts of being better off dead, or hurting yourself in some way Not at all   PHQ 9 Score 0   How difficult have these problems made it for you to do your work, take care of your home and get along with others -     Fall Risk Assessment:     Fall Risk Assessment, last 12 mths 2021   Able to walk? Yes   Fall in past 12 months? 0   Do you feel unsteady?  0   Are you worried about falling 0     Abuse Screening: Abuse Screening Questionnaire 1/28/2021   Do you ever feel afraid of your partner? N   Are you in a relationship with someone who physically or mentally threatens you? N   Is it safe for you to go home? Y     ADL Assessment:   No flowsheet data found. Coordination of Care Questionaire:   1. \"Have you been to the ER, urgent care clinic since your last visit? Hospitalized since your last visit? \" No    2. \"Have you seen or consulted any other health care providers outside of the [address] [place] [address] since your last visit? \" No     3. For patients aged 39-70: Has the patient had a colonoscopy? NA - based on age     If the patient is female:    4. For patients aged 41-77: Has the patient had a mammogram within the past 2 years? NA - based on age    11. For patients aged 21-65: Has the patient had a pap smear? Yes - Care Gap present. Most recent result on file scheduled for next month . Advanced Directive:   1. Do you have an Advanced Directive? Yes     2. Would you like information on Advanced Directives? NO  Flu shot Immunization/s administered 2/8/2023 by Cristóbal De La O LPN with guardian's consent. Patient tolerated procedure well. No reactions noted.

## 2023-02-08 NOTE — PATIENT INSTRUCTIONS
Current status:  Satisfactory general health evaluation  Satisfactory vitamin D level  Bipolar disorder followed by psychiatry with symptoms reported as stable    Health maintenance recommendations:  COVID-19 immunization with bivalent vaccine  Influenza immunization-today  Pap smear current    Plan:  Continue current medications  Continue with psychiatry follow-up  Return for annual physical exam and follow-up in 1 year or sooner with any problems  Please always arrive at least 15 minutes before your scheduled appointment time

## 2023-03-13 ENCOUNTER — TELEPHONE (OUTPATIENT)
Dept: FAMILY MEDICINE CLINIC | Facility: CLINIC | Age: 33
End: 2023-03-13

## 2023-03-13 NOTE — TELEPHONE ENCOUNTER
Pt called wanting to know if [facility] ob[facility]yn has sent over her last pap results to put in her chart.  Please advise

## 2023-03-17 ENCOUNTER — TELEPHONE (OUTPATIENT)
Dept: FAMILY MEDICINE CLINIC | Facility: CLINIC | Age: 33
End: 2023-03-17

## 2023-03-17 PROBLEM — E55.9 VITAMIN D DEFICIENCY: Status: RESOLVED | Noted: 2022-09-23 | Resolved: 2023-03-17

## 2023-03-17 PROBLEM — O48.0 POST TERM PREGNANCY: Status: RESOLVED | Noted: 2018-05-08 | Resolved: 2023-03-17

## 2023-03-17 NOTE — TELEPHONE ENCOUNTER
Pt is calling in regards to her mychart. She saw 2 things in her health issues tab that she is wanting to have removed if possible. The first one is it's stating she has a vitamin d deficiency starting from 9/23/2022. Pt states that that was noted by her psych and she was advised to follow up with us and according to the patient Dr Rajinder Child told her that she was fine and did not have an iron deficiency. The other issues is that it says she has post term pregnancy. Pt has not recently had a baby. She stated her daughter is now 3years old. Please advise.

## 2023-04-19 ENCOUNTER — TELEPHONE (OUTPATIENT)
Dept: FAMILY MEDICINE CLINIC | Facility: CLINIC | Age: 33
End: 2023-04-19

## 2023-04-19 NOTE — TELEPHONE ENCOUNTER
Pt called stating that her QUEtiapine (SEROQUEL) 300 MG tablet has been adjusted from 300 to 200 mg and pt would like to have this updated in her chart. Pt is also requesting a call back to make sure that the chart has been updated.

## 2023-07-24 ENCOUNTER — TELEPHONE (OUTPATIENT)
Dept: FAMILY MEDICINE CLINIC | Facility: CLINIC | Age: 33
End: 2023-07-24

## 2023-07-24 NOTE — TELEPHONE ENCOUNTER
Pt's psychiatrist is changing practice.  Pt wanted to inform PCP of the change and would like to add this to her care team. Please advise

## 2023-09-06 ENCOUNTER — TELEPHONE (OUTPATIENT)
Dept: FAMILY MEDICINE CLINIC | Facility: CLINIC | Age: 33
End: 2023-09-06

## 2023-09-06 DIAGNOSIS — E55.9 VITAMIN D DEFICIENCY, UNSPECIFIED: Primary | ICD-10-CM

## 2023-09-06 DIAGNOSIS — Z79.899 LONG TERM CURRENT USE OF ANTIPSYCHOTIC MEDICATION: ICD-10-CM

## 2023-09-06 DIAGNOSIS — Z13.220 ENCOUNTER FOR LIPID SCREENING FOR CARDIOVASCULAR DISEASE: ICD-10-CM

## 2023-09-06 DIAGNOSIS — Z28.39 INCOMPLETE IMMUNIZATION STATUS: ICD-10-CM

## 2023-09-06 DIAGNOSIS — F31.9 BIPOLAR AFFECTIVE DISORDER, REMISSION STATUS UNSPECIFIED (HCC): ICD-10-CM

## 2023-09-06 DIAGNOSIS — Z13.6 ENCOUNTER FOR LIPID SCREENING FOR CARDIOVASCULAR DISEASE: ICD-10-CM

## 2023-09-06 NOTE — TELEPHONE ENCOUNTER
----- Message from [facility] sent at 9/5/2023  9:38 AM EDT -----  Subject: Referral Request    Reason for referral request? Annual Physical Labs, Hep B test   Provider patient wants to be referred to(if known):     Provider Phone Number(if known): Additional Information for Provider? Patient called in to request order   for annual labs and Hep B test prior to her schedule physical on   2/12/2024.  Please contact patient to further assist.   ---------------------------------------------------------------------------  --------------  Gregorio Hamilton INFO    [phone]; OK to leave message on voicemail  ---------------------------------------------------------------------------  --------------

## 2023-09-12 ENCOUNTER — TELEPHONE (OUTPATIENT)
Dept: FAMILY MEDICINE CLINIC | Facility: CLINIC | Age: 33
End: 2023-09-12

## 2023-09-25 ENCOUNTER — TELEPHONE (OUTPATIENT)
Dept: FAMILY MEDICINE CLINIC | Facility: CLINIC | Age: 33
End: 2023-09-25

## 2023-09-25 NOTE — TELEPHONE ENCOUNTER
Pt called stating she would like to have her chart updated to reflect that her Adderall RX has been changed from 5MG to 10MG. Message sent to clinical staff so they are aware.

## 2024-02-05 ENCOUNTER — NURSE ONLY (OUTPATIENT)
Dept: FAMILY MEDICINE CLINIC | Facility: CLINIC | Age: 34
End: 2024-02-05

## 2024-02-05 DIAGNOSIS — F31.9 BIPOLAR AFFECTIVE DISORDER, REMISSION STATUS UNSPECIFIED (HCC): ICD-10-CM

## 2024-02-05 DIAGNOSIS — Z79.899 LONG TERM CURRENT USE OF ANTIPSYCHOTIC MEDICATION: ICD-10-CM

## 2024-02-05 DIAGNOSIS — Z13.6 ENCOUNTER FOR LIPID SCREENING FOR CARDIOVASCULAR DISEASE: ICD-10-CM

## 2024-02-05 DIAGNOSIS — Z13.220 ENCOUNTER FOR LIPID SCREENING FOR CARDIOVASCULAR DISEASE: ICD-10-CM

## 2024-02-05 DIAGNOSIS — E55.9 VITAMIN D DEFICIENCY, UNSPECIFIED: ICD-10-CM

## 2024-02-05 DIAGNOSIS — Z28.39 INCOMPLETE IMMUNIZATION STATUS: ICD-10-CM

## 2024-02-06 LAB
25(OH)D3+25(OH)D2 SERPL-MCNC: 52.4 NG/ML (ref 30–100)
ALBUMIN SERPL-MCNC: 4.7 G/DL (ref 3.9–4.9)
ALBUMIN/GLOB SERPL: 1.6 {RATIO} (ref 1.2–2.2)
ALP SERPL-CCNC: 57 IU/L (ref 44–121)
ALT SERPL-CCNC: 15 IU/L (ref 0–32)
APPEARANCE UR: ABNORMAL
AST SERPL-CCNC: 19 IU/L (ref 0–40)
BASOPHILS # BLD AUTO: 0.1 X10E3/UL (ref 0–0.2)
BASOPHILS NFR BLD AUTO: 1 %
BILIRUB SERPL-MCNC: 0.5 MG/DL (ref 0–1.2)
BILIRUB UR QL STRIP: ABNORMAL
BUN SERPL-MCNC: 8 MG/DL (ref 6–20)
BUN/CREAT SERPL: 13 (ref 9–23)
CALCIUM SERPL-MCNC: 9.3 MG/DL (ref 8.7–10.2)
CHLORIDE SERPL-SCNC: 100 MMOL/L (ref 96–106)
CHOLEST SERPL-MCNC: 206 MG/DL (ref 100–199)
CO2 SERPL-SCNC: 25 MMOL/L (ref 20–29)
COLOR UR: ABNORMAL
CREAT SERPL-MCNC: 0.64 MG/DL (ref 0.57–1)
EGFRCR SERPLBLD CKD-EPI 2021: 119 ML/MIN/1.73
EOSINOPHIL # BLD AUTO: 0.1 X10E3/UL (ref 0–0.4)
EOSINOPHIL NFR BLD AUTO: 3 %
ERYTHROCYTE [DISTWIDTH] IN BLOOD BY AUTOMATED COUNT: 12.3 % (ref 11.7–15.4)
GLOBULIN SER CALC-MCNC: 3 G/DL (ref 1.5–4.5)
GLUCOSE SERPL-MCNC: 86 MG/DL (ref 70–99)
GLUCOSE UR QL STRIP: NEGATIVE
HBA1C MFR BLD: 5.4 % (ref 4.8–5.6)
HBV SURFACE AB SER QL: NON REACTIVE
HCT VFR BLD AUTO: 42.4 % (ref 34–46.6)
HDLC SERPL-MCNC: 78 MG/DL
HGB BLD-MCNC: 13.8 G/DL (ref 11.1–15.9)
HGB UR QL STRIP: ABNORMAL
IMM GRANULOCYTES # BLD AUTO: 0 X10E3/UL (ref 0–0.1)
IMM GRANULOCYTES NFR BLD AUTO: 1 %
KETONES UR QL STRIP: ABNORMAL
LDLC SERPL CALC-MCNC: 114 MG/DL (ref 0–99)
LEUKOCYTE ESTERASE UR QL STRIP: ABNORMAL
LYMPHOCYTES # BLD AUTO: 1.9 X10E3/UL (ref 0.7–3.1)
LYMPHOCYTES NFR BLD AUTO: 47 %
MCH RBC QN AUTO: 28.5 PG (ref 26.6–33)
MCHC RBC AUTO-ENTMCNC: 32.5 G/DL (ref 31.5–35.7)
MCV RBC AUTO: 87 FL (ref 79–97)
MONOCYTES # BLD AUTO: 0.5 X10E3/UL (ref 0.1–0.9)
MONOCYTES NFR BLD AUTO: 12 %
NEUTROPHILS # BLD AUTO: 1.4 X10E3/UL (ref 1.4–7)
NEUTROPHILS NFR BLD AUTO: 36 %
NITRITE UR QL STRIP: NEGATIVE
PH UR STRIP: 6 [PH] (ref 5–7.5)
PLATELET # BLD AUTO: 383 X10E3/UL (ref 150–450)
POTASSIUM SERPL-SCNC: 3.8 MMOL/L (ref 3.5–5.2)
PROT SERPL-MCNC: 7.7 G/DL (ref 6–8.5)
PROT UR QL STRIP: ABNORMAL
RBC # BLD AUTO: 4.85 X10E6/UL (ref 3.77–5.28)
SODIUM SERPL-SCNC: 140 MMOL/L (ref 134–144)
SP GR UR STRIP: 1.03 (ref 1–1.03)
TRIGL SERPL-MCNC: 78 MG/DL (ref 0–149)
TSH SERPL DL<=0.005 MIU/L-ACNC: 2.45 UIU/ML (ref 0.45–4.5)
UROBILINOGEN UR STRIP-MCNC: 0.2 MG/DL (ref 0.2–1)
VLDLC SERPL CALC-MCNC: 14 MG/DL (ref 5–40)
WBC # BLD AUTO: 4 X10E3/UL (ref 3.4–10.8)

## 2024-02-12 ENCOUNTER — OFFICE VISIT (OUTPATIENT)
Dept: FAMILY MEDICINE CLINIC | Facility: CLINIC | Age: 34
End: 2024-02-12
Payer: COMMERCIAL

## 2024-02-12 VITALS
WEIGHT: 143 LBS | SYSTOLIC BLOOD PRESSURE: 120 MMHG | RESPIRATION RATE: 16 BRPM | HEIGHT: 64 IN | TEMPERATURE: 98.1 F | HEART RATE: 102 BPM | DIASTOLIC BLOOD PRESSURE: 80 MMHG | OXYGEN SATURATION: 99 % | BODY MASS INDEX: 24.41 KG/M2

## 2024-02-12 DIAGNOSIS — E55.9 VITAMIN D DEFICIENCY, UNSPECIFIED: ICD-10-CM

## 2024-02-12 DIAGNOSIS — F31.9 BIPOLAR AFFECTIVE DISORDER, REMISSION STATUS UNSPECIFIED (HCC): ICD-10-CM

## 2024-02-12 DIAGNOSIS — Z00.00 ROUTINE GENERAL MEDICAL EXAMINATION AT A HEALTH CARE FACILITY: Primary | ICD-10-CM

## 2024-02-12 DIAGNOSIS — F90.9 ATTENTION DEFICIT HYPERACTIVITY DISORDER (ADHD), UNSPECIFIED ADHD TYPE: ICD-10-CM

## 2024-02-12 PROCEDURE — 90674 CCIIV4 VAC NO PRSV 0.5 ML IM: CPT | Performed by: FAMILY MEDICINE

## 2024-02-12 PROCEDURE — 99395 PREV VISIT EST AGE 18-39: CPT | Performed by: FAMILY MEDICINE

## 2024-02-12 PROCEDURE — 90472 IMMUNIZATION ADMIN EACH ADD: CPT | Performed by: FAMILY MEDICINE

## 2024-02-12 PROCEDURE — 90739 HEPB VACC 2/4 DOSE ADULT IM: CPT | Performed by: FAMILY MEDICINE

## 2024-02-12 PROCEDURE — 90471 IMMUNIZATION ADMIN: CPT | Performed by: FAMILY MEDICINE

## 2024-02-12 NOTE — PROGRESS NOTES
Lucila Oliveira is a 34 y.o. female (: 1990) presenting to address:    Chief Complaint   Patient presents with    Annual Exam    Immunizations       Vitals:    24 0932   BP: 120/80   Pulse: (!) 102   Resp: 16   Temp: 98.1 °F (36.7 °C)   SpO2: 99%       \"Have you been to the ER, urgent care clinic since your last visit?  Hospitalized since your last visit?\"    NO    “Have you seen or consulted any other health care providers outside of [facility] since your last visit?”    NO             
Conjunctiva/sclera: Conjunctivae normal.      Pupils: Pupils are equal, round, and reactive to light.   Neck:      Vascular: No carotid bruit.   Cardiovascular:      Rate and Rhythm: Normal rate and regular rhythm.      Heart sounds: Normal heart sounds.   Pulmonary:      Effort: Pulmonary effort is normal.      Breath sounds: Normal breath sounds.   Abdominal:      Palpations: Abdomen is soft.      Tenderness: There is no abdominal tenderness.   Musculoskeletal:      Cervical back: Normal range of motion and neck supple.   Skin:     General: Skin is warm and dry.      Findings: No rash.   Neurological:      Mental Status: She is alert.   Psychiatric:         Mood and Affect: Mood normal.         Behavior: Behavior normal.         Thought Content: Thought content normal.                ASSESSMENT and PLAN    1. Routine general medical examination at a health care facility  2. Bipolar affective disorder, remission status unspecified (HCC)  3. Attention deficit hyperactivity disorder (ADHD), unspecified ADHD type  4. Vitamin D deficiency, unspecified    Current Status:  Bipolar affective disorder symptoms reported stable and adequately managed with current treatment by the patient-followed by psychiatry  ADHD symptoms reported stable and adequately managed with current treatment by the patient-followed by psychiatry  Satisfactory vitamin D level    Health Maintenance Recommendations:  Keep current with COVID-19 immunization guidelines  Hepatitis B immunization series #1 today, #2 anytime greater than 30 days from now with a nurse visit appointment  Influenza immunization today      Plan:  Continue current medications  Continue psychiatry and gynecology follow-up as recommended by the consultants  Return for annual physical exam and follow-up in 1 year, return sooner with any problems  Please arrive at least 15 minutes prior to your scheduled appointment time        Roberto Jacinto MD    Please Note:  This document has

## 2024-02-12 NOTE — PATIENT INSTRUCTIONS
Current Status:  Bipolar affective disorder symptoms reported stable and adequately managed with current treatment by the patient-followed by psychiatry  ADHD symptoms reported stable and adequately managed with current treatment by the patient-followed by psychiatry  Satisfactory vitamin D low    Health Maintenance Recommendations:  Keep current with COVID-19 immunization guidelines  Hepatitis B immunization series #1 today, #2 anytime greater than 30 days from now with a nurse visit appointment  Influenza immunization today      Plan:  Continue current medications  Continue psychiatry and gynecology follow-up as recommended by the consultants  Return for annual physical exam and follow-up in 1 year, return sooner with any problems  Please arrive at least 15 minutes prior to your scheduled appointment time

## 2024-03-12 ENCOUNTER — OFFICE VISIT (OUTPATIENT)
Dept: FAMILY MEDICINE CLINIC | Facility: CLINIC | Age: 34
End: 2024-03-12
Payer: COMMERCIAL

## 2024-03-12 ENCOUNTER — TELEPHONE (OUTPATIENT)
Dept: FAMILY MEDICINE CLINIC | Facility: CLINIC | Age: 34
End: 2024-03-12

## 2024-03-12 DIAGNOSIS — Z23 ENCOUNTER FOR IMMUNIZATION: Primary | ICD-10-CM

## 2024-03-12 PROCEDURE — 90471 IMMUNIZATION ADMIN: CPT | Performed by: FAMILY MEDICINE

## 2024-03-12 PROCEDURE — 90739 HEPB VACC 2/4 DOSE ADULT IM: CPT | Performed by: FAMILY MEDICINE

## 2024-03-12 NOTE — TELEPHONE ENCOUNTER
Pt called inquiring how her care team members have been changed; she advised she was unable to see them in [facility]. Let pt know I've only known the patient or the office itself to have the ability to add/remove care team members but pt stated that we should be able to update them all from this office. Message sent to the clinical staff to see if clarification can be provided since I was not able to answer her questions.

## 2024-03-12 NOTE — PROGRESS NOTES
Hep B#2 Immunization/s administered 3/12/2024 by Mariam Mcnair LPN   Patient tolerated procedure well.  No reactions noted.

## 2024-03-12 NOTE — TELEPHONE ENCOUNTER
I spoke with patient she noticed Her psych NP's name was spelled wrong I reviewed the care team the correct spelling of providers name was listed as well as the wrong one I removed the incorrect one . Also patient said that her therapist was missing . I was not able to search her name but not patient I could add her another way . She voiced understanding .

## 2024-03-13 ENCOUNTER — TELEPHONE (OUTPATIENT)
Dept: FAMILY MEDICINE CLINIC | Facility: CLINIC | Age: 34
End: 2024-03-13

## 2024-03-13 NOTE — TELEPHONE ENCOUNTER
----- Message from Catrachito Sergio sent at 3/12/2024  3:40 PM EDT -----  Subject: Message to Provider    QUESTIONS  Information for Provider? Patient was calling to speak with the nurse she   said she see her mychart updated she said her dentist doctor name is   doctor Jeffrey and for her therapist she said it just have to say that not   outpatient therapist, patient would like a call back.  ---------------------------------------------------------------------------  --------------  CALL BACK INFO  [phone]; OK to leave message on voicemail  ---------------------------------------------------------------------------  --------------  SCRIPT ANSWERS  Relationship to Patient? Self

## 2024-03-13 NOTE — TELEPHONE ENCOUNTER
Pt called back requesting to speak to the nurse. Inquired if I could relay a specific message but pt declined and stated she would just like a call back from the clinical staff when they are available.

## 2024-03-18 ENCOUNTER — TELEPHONE (OUTPATIENT)
Dept: FAMILY MEDICINE CLINIC | Facility: CLINIC | Age: 34
End: 2024-03-18

## 2024-04-29 ENCOUNTER — TELEPHONE (OUTPATIENT)
Dept: FAMILY MEDICINE CLINIC | Facility: CLINIC | Age: 34
End: 2024-04-29

## 2024-04-29 NOTE — TELEPHONE ENCOUNTER
Pt is requesting a call back. Inquired what message she would like to relay but she stated she would just like a call from the nurse.

## 2024-04-30 NOTE — TELEPHONE ENCOUNTER
Patient called and says on my chart some of her care team providers look funny, so she just wants them removed her dentist Dr jackson and Chau Landon .

## 2024-09-04 ENCOUNTER — TELEPHONE (OUTPATIENT)
Dept: FAMILY MEDICINE CLINIC | Facility: CLINIC | Age: 34
End: 2024-09-04

## 2024-09-04 DIAGNOSIS — Z28.39 INCOMPLETE IMMUNIZATION STATUS: ICD-10-CM

## 2024-09-04 DIAGNOSIS — Z13.6 ENCOUNTER FOR LIPID SCREENING FOR CARDIOVASCULAR DISEASE: ICD-10-CM

## 2024-09-04 DIAGNOSIS — Z13.220 ENCOUNTER FOR LIPID SCREENING FOR CARDIOVASCULAR DISEASE: ICD-10-CM

## 2024-09-04 DIAGNOSIS — Z79.899 LONG TERM CURRENT USE OF ANTIPSYCHOTIC MEDICATION: ICD-10-CM

## 2024-09-04 DIAGNOSIS — F31.9 BIPOLAR AFFECTIVE DISORDER, REMISSION STATUS UNSPECIFIED (HCC): ICD-10-CM

## 2024-09-04 DIAGNOSIS — E55.9 VITAMIN D DEFICIENCY, UNSPECIFIED: ICD-10-CM

## 2024-09-04 DIAGNOSIS — Z00.00 ROUTINE HEALTH MAINTENANCE: Primary | ICD-10-CM

## 2024-09-04 NOTE — TELEPHONE ENCOUNTER
Pt called in and was requesting the office fax the hep b orders to her gynecologist from Feb. 2024 & March 2024.  When looking in the chart, I do see the hep b order from Feb. 2024 but not March 2024.  Told the pt that I would send the msg to clinical staff for review for the March 2024 hep b that pt said she had done here in the office.

## 2024-09-04 NOTE — TELEPHONE ENCOUNTER
Pt called back and inquired about her hep B documentation. Advised that it was on file and previous rep most likely was not able to see/find the information; confirmed she did have immunizations done 02/12/24 & 03/12/24. She then requested to have her immunization information sent to [facility] fx#[phone] and would like a call when it is sent as she has additional questions.

## 2024-09-04 NOTE — TELEPHONE ENCOUNTER
Pt called requesting to get labs ordered prior to her CPE appt; she would also like a hep B lab included in the panel.    Future Appointments   Date Time Provider Department Center   2/18/2025  8:00 AM [facility]

## 2024-09-11 ENCOUNTER — TELEPHONE (OUTPATIENT)
Dept: FAMILY MEDICINE CLINIC | Facility: CLINIC | Age: 34
End: 2024-09-11

## 2025-02-12 LAB
25(OH)D3+25(OH)D2 SERPL-MCNC: 46.1 NG/ML (ref 30–100)
ALBUMIN SERPL-MCNC: 4.5 G/DL (ref 3.9–4.9)
ALP SERPL-CCNC: 51 IU/L (ref 44–121)
ALT SERPL-CCNC: 20 IU/L (ref 0–32)
APPEARANCE UR: CLEAR
AST SERPL-CCNC: 23 IU/L (ref 0–40)
BASOPHILS # BLD AUTO: 0 X10E3/UL (ref 0–0.2)
BASOPHILS NFR BLD AUTO: 1 %
BILIRUB SERPL-MCNC: 0.5 MG/DL (ref 0–1.2)
BILIRUB UR QL STRIP: NEGATIVE
BUN SERPL-MCNC: 8 MG/DL (ref 6–20)
BUN/CREAT SERPL: 12 (ref 9–23)
CALCIUM SERPL-MCNC: 9.4 MG/DL (ref 8.7–10.2)
CHLORIDE SERPL-SCNC: 101 MMOL/L (ref 96–106)
CHOLEST SERPL-MCNC: 192 MG/DL (ref 100–199)
CO2 SERPL-SCNC: 26 MMOL/L (ref 20–29)
COLOR UR: YELLOW
CREAT SERPL-MCNC: 0.67 MG/DL (ref 0.57–1)
EGFRCR SERPLBLD CKD-EPI 2021: 116 ML/MIN/1.73
EOSINOPHIL # BLD AUTO: 0.1 X10E3/UL (ref 0–0.4)
EOSINOPHIL NFR BLD AUTO: 2 %
ERYTHROCYTE [DISTWIDTH] IN BLOOD BY AUTOMATED COUNT: 12.3 % (ref 11.7–15.4)
GLOBULIN SER CALC-MCNC: 2.4 G/DL (ref 1.5–4.5)
GLUCOSE SERPL-MCNC: 83 MG/DL (ref 70–99)
GLUCOSE UR QL STRIP: NEGATIVE
HBA1C MFR BLD: 5.4 % (ref 4.8–5.6)
HBV SURFACE AB SER QL: REACTIVE
HCT VFR BLD AUTO: 43 % (ref 34–46.6)
HDLC SERPL-MCNC: 81 MG/DL
HGB BLD-MCNC: 13.8 G/DL (ref 11.1–15.9)
HGB UR QL STRIP: NEGATIVE
IMM GRANULOCYTES # BLD AUTO: 0 X10E3/UL (ref 0–0.1)
IMM GRANULOCYTES NFR BLD AUTO: 1 %
KETONES UR QL STRIP: ABNORMAL
LDLC SERPL CALC-MCNC: 102 MG/DL (ref 0–99)
LEUKOCYTE ESTERASE UR QL STRIP: NEGATIVE
LYMPHOCYTES # BLD AUTO: 2 X10E3/UL (ref 0.7–3.1)
LYMPHOCYTES NFR BLD AUTO: 47 %
MCH RBC QN AUTO: 28 PG (ref 26.6–33)
MCHC RBC AUTO-ENTMCNC: 32.1 G/DL (ref 31.5–35.7)
MCV RBC AUTO: 87 FL (ref 79–97)
MONOCYTES # BLD AUTO: 0.5 X10E3/UL (ref 0.1–0.9)
MONOCYTES NFR BLD AUTO: 11 %
NEUTROPHILS # BLD AUTO: 1.6 X10E3/UL (ref 1.4–7)
NEUTROPHILS NFR BLD AUTO: 38 %
NITRITE UR QL STRIP: NEGATIVE
PH UR STRIP: 6 [PH] (ref 5–7.5)
PLATELET # BLD AUTO: 443 X10E3/UL (ref 150–450)
POTASSIUM SERPL-SCNC: 4 MMOL/L (ref 3.5–5.2)
PROT SERPL-MCNC: 6.9 G/DL (ref 6–8.5)
PROT UR QL STRIP: ABNORMAL
RBC # BLD AUTO: 4.92 X10E6/UL (ref 3.77–5.28)
SODIUM SERPL-SCNC: 139 MMOL/L (ref 134–144)
SP GR UR STRIP: 1.03 (ref 1–1.03)
TRIGL SERPL-MCNC: 49 MG/DL (ref 0–149)
TSH SERPL DL<=0.005 MIU/L-ACNC: 2.4 UIU/ML (ref 0.45–4.5)
UROBILINOGEN UR STRIP-MCNC: 0.2 MG/DL (ref 0.2–1)
VLDLC SERPL CALC-MCNC: 9 MG/DL (ref 5–40)
WBC # BLD AUTO: 4.2 X10E3/UL (ref 3.4–10.8)

## 2025-02-17 ASSESSMENT — ENCOUNTER SYMPTOMS
ABDOMINAL PAIN: 0
VOMITING: 0
SORE THROAT: 0
DIARRHEA: 0
NAUSEA: 0
COUGH: 0
BLOOD IN STOOL: 0
CONSTIPATION: 0
SHORTNESS OF BREATH: 0
WHEEZING: 0
ANAL BLEEDING: 0

## 2025-02-17 NOTE — PROGRESS NOTES
HISTORY OF PRESENT ILLNESS  Lucila Oliveira  is a 35 y.o. y.o. female    She presents for health assessment, preventative care and follow-up with a history of vitamin D deficiency, bipolar disorder and ADHD followed by psychiatry.        Mr#: [id number]      Past Medical History:   Diagnosis Date    ADHD (attention deficit hyperactivity disorder)     Bipolar disorder (HCC) 3/10/2010    Polyphagia(783.6) 3/10/2010       Past Surgical History:   Procedure Laterality Date    GYN      D&C    LEEP  08/04/2022    Pathology with high-grade dysplasia IGNACIA III       Family History   Problem Relation Age of Onset    No Known Problems Brother     No Known Problems Sister     Diabetes Maternal Grandfather     No Known Problems Mother        No Known Allergies    Social History     Tobacco Use   Smoking Status Former    Current packs/day: 0.50    Types: Cigarettes   Smokeless Tobacco Never       Social History     Substance and Sexual Activity   Alcohol Use No       Immunization History   Administered Date(s) Administered    COVID-19, J&J, (age 18y+), IM, 0.5 mL 07/13/2021, 12/06/2021    DTP 1990, 1990, 1990, 1989, 11/02/1990    HPV (Human Papilloma Virus)Vaccine 03/31/2021, 07/06/2021, 10/05/2021    Hep B, ENGERIX-B, RECOMBIVAX-HB, (age Birth - 19y), IM, 0.5mL 01/11/2001, 03/22/2001, 05/24/2001    Hep B, HEPLISAV-B, (age 18y+), IM, 0.5mL 02/12/2024, 03/12/2024    Hib (HbOC) 11/02/1990    Influenza, FLUARIX, FLULAVAL, FLUZONE (age 6 mo+) and AFLURIA, (age 3 y+), Quadv PF, 0.5mL 10/04/2018, 09/23/2020, 12/21/2021, 02/08/2023    Influenza, FLUCELVAX, (age 6 mo+), MDCK, Quadv PF, 0.5mL 02/12/2024    MMR, PRIORIX, M-M-R II, (age 12m+), SC, 0.5mL 11/02/1990    Pneumococcal, PPSV23, PNEUMOVAX 23, (age 2y+), SC/IM, 0.5mL 01/27/2020    Polio OPV 1989, 1989, 1989, 11/02/1990    TDaP, ADACEL (age 10y-64y), BOOSTRIX (age 10y+), IM, 0.5mL 05/10/2018       Patient Active Problem List   Diagnosis

## 2025-02-18 ENCOUNTER — OFFICE VISIT (OUTPATIENT)
Dept: FAMILY MEDICINE CLINIC | Facility: CLINIC | Age: 35
End: 2025-02-18
Payer: COMMERCIAL

## 2025-02-18 VITALS
HEART RATE: 104 BPM | DIASTOLIC BLOOD PRESSURE: 80 MMHG | RESPIRATION RATE: 16 BRPM | TEMPERATURE: 98.8 F | OXYGEN SATURATION: 98 % | HEIGHT: 64 IN | BODY MASS INDEX: 24.41 KG/M2 | SYSTOLIC BLOOD PRESSURE: 130 MMHG | WEIGHT: 143 LBS

## 2025-02-18 DIAGNOSIS — F31.70 BIPOLAR AFFECTIVE DISORDER IN REMISSION: ICD-10-CM

## 2025-02-18 DIAGNOSIS — Z00.00 ROUTINE GENERAL MEDICAL EXAMINATION AT A HEALTH CARE FACILITY: Primary | ICD-10-CM

## 2025-02-18 DIAGNOSIS — F90.9 ATTENTION DEFICIT HYPERACTIVITY DISORDER (ADHD), UNSPECIFIED ADHD TYPE: ICD-10-CM

## 2025-02-18 DIAGNOSIS — E55.9 VITAMIN D DEFICIENCY, UNSPECIFIED: ICD-10-CM

## 2025-02-18 PROCEDURE — 99395 PREV VISIT EST AGE 18-39: CPT | Performed by: FAMILY MEDICINE

## 2025-02-18 SDOH — ECONOMIC STABILITY: FOOD INSECURITY: WITHIN THE PAST 12 MONTHS, YOU WORRIED THAT YOUR FOOD WOULD RUN OUT BEFORE YOU GOT MONEY TO BUY MORE.: NEVER TRUE

## 2025-02-18 SDOH — ECONOMIC STABILITY: FOOD INSECURITY: WITHIN THE PAST 12 MONTHS, THE FOOD YOU BOUGHT JUST DIDN'T LAST AND YOU DIDN'T HAVE MONEY TO GET MORE.: NEVER TRUE

## 2025-02-18 ASSESSMENT — PATIENT HEALTH QUESTIONNAIRE - PHQ9
3. TROUBLE FALLING OR STAYING ASLEEP: NOT AT ALL
10. IF YOU CHECKED OFF ANY PROBLEMS, HOW DIFFICULT HAVE THESE PROBLEMS MADE IT FOR YOU TO DO YOUR WORK, TAKE CARE OF THINGS AT HOME, OR GET ALONG WITH OTHER PEOPLE: NOT DIFFICULT AT ALL
SUM OF ALL RESPONSES TO PHQ QUESTIONS 1-9: 0
4. FEELING TIRED OR HAVING LITTLE ENERGY: NOT AT ALL
SUM OF ALL RESPONSES TO PHQ QUESTIONS 1-9: 0
1. LITTLE INTEREST OR PLEASURE IN DOING THINGS: NOT AT ALL
SUM OF ALL RESPONSES TO PHQ9 QUESTIONS 1 & 2: 0
5. POOR APPETITE OR OVEREATING: NOT AT ALL
8. MOVING OR SPEAKING SO SLOWLY THAT OTHER PEOPLE COULD HAVE NOTICED. OR THE OPPOSITE, BEING SO FIGETY OR RESTLESS THAT YOU HAVE BEEN MOVING AROUND A LOT MORE THAN USUAL: NOT AT ALL
SUM OF ALL RESPONSES TO PHQ QUESTIONS 1-9: 0
7. TROUBLE CONCENTRATING ON THINGS, SUCH AS READING THE NEWSPAPER OR WATCHING TELEVISION: NOT AT ALL
6. FEELING BAD ABOUT YOURSELF - OR THAT YOU ARE A FAILURE OR HAVE LET YOURSELF OR YOUR FAMILY DOWN: NOT AT ALL
2. FEELING DOWN, DEPRESSED OR HOPELESS: NOT AT ALL
9. THOUGHTS THAT YOU WOULD BE BETTER OFF DEAD, OR OF HURTING YOURSELF: NOT AT ALL
SUM OF ALL RESPONSES TO PHQ QUESTIONS 1-9: 0

## 2025-02-18 NOTE — PROGRESS NOTES
Lucila Oliveira is a 35 y.o. female (: 1990) presenting to address:    Chief Complaint   Patient presents with    Annual Exam       Vitals:    25 0826   BP: 130/80   Pulse: (!) 104   Resp: 16   Temp: 98.8 °F (37.1 °C)   SpO2: 98%       \"Have you been to the ER, urgent care clinic since your last visit?  Hospitalized since your last visit?\"    NO    “Have you seen or consulted any other health care providers outside of [facility] since your last visit?”    NO        “Have you had a pap smear?”    NO    Date of last Cervical Cancer screen (HPV or PAP): 2023

## 2025-02-18 NOTE — PATIENT INSTRUCTIONS
Current Status:  Satisfactory vitamin D level  Bipolar disorder and ADHD symptoms adequately managed with current treatment, followed by psychiatry    Health Maintenance Recommendations:  Influenza immunization recommended every fall   COVID-19 immunization booster-available at the pharmacy  Pap smear followed by gynecology    Plan:  Continue current medications  Continue psychiatry and gynecology follow-up as recommended by the consultants  Return for annual physical exam and follow-up in 1 year, return sooner with any problems  Please arrive at least 15 minutes prior to your scheduled appointment time

## 2025-03-21 LAB — HBA1C MFR BLD HPLC: 5.1 %

## 2025-03-25 ENCOUNTER — TELEPHONE (OUTPATIENT)
Dept: FAMILY MEDICINE CLINIC | Facility: CLINIC | Age: 35
End: 2025-03-25

## 2025-03-25 NOTE — TELEPHONE ENCOUNTER
Records received and given to Dr Jacinto to review. I called and left message to notify patient.

## 2025-03-25 NOTE — TELEPHONE ENCOUNTER
Pt called stating her GYN office sent over notes and labs and she would like a call to confirm when they are received.

## 2025-05-29 ENCOUNTER — PATIENT MESSAGE (OUTPATIENT)
Dept: FAMILY MEDICINE CLINIC | Facility: CLINIC | Age: 35
End: 2025-05-29